# Patient Record
Sex: FEMALE | Race: WHITE | NOT HISPANIC OR LATINO | Employment: OTHER | URBAN - METROPOLITAN AREA
[De-identification: names, ages, dates, MRNs, and addresses within clinical notes are randomized per-mention and may not be internally consistent; named-entity substitution may affect disease eponyms.]

---

## 2017-09-13 ENCOUNTER — HOSPITAL ENCOUNTER (OUTPATIENT)
Facility: MEDICAL CENTER | Age: 67
End: 2017-09-13
Attending: OPHTHALMOLOGY | Admitting: OPHTHALMOLOGY
Payer: MEDICARE

## 2017-10-26 ENCOUNTER — HOSPITAL ENCOUNTER (OUTPATIENT)
Facility: MEDICAL CENTER | Age: 67
End: 2017-10-26
Attending: OPHTHALMOLOGY | Admitting: OPHTHALMOLOGY
Payer: MEDICARE

## 2017-11-01 ENCOUNTER — APPOINTMENT (OUTPATIENT)
Dept: ADMISSIONS | Facility: MEDICAL CENTER | Age: 67
End: 2017-11-01
Attending: OPHTHALMOLOGY
Payer: MEDICARE

## 2017-11-01 DIAGNOSIS — Z01.810 PRE-OPERATIVE CARDIOVASCULAR EXAMINATION: ICD-10-CM

## 2017-11-01 LAB — EKG IMPRESSION: NORMAL

## 2017-11-01 PROCEDURE — 93010 ELECTROCARDIOGRAM REPORT: CPT | Performed by: INTERNAL MEDICINE

## 2017-11-01 PROCEDURE — 93005 ELECTROCARDIOGRAM TRACING: CPT

## 2017-11-01 RX ORDER — UBIDECARENONE 75 MG
100 CAPSULE ORAL
COMMUNITY

## 2017-11-01 RX ORDER — CHLORAL HYDRATE 500 MG
1 CAPSULE ORAL
COMMUNITY
End: 2017-12-12

## 2017-11-01 RX ORDER — VIT C/B6/B5/MAGNESIUM/HERB 173 50-5-6-5MG
1 CAPSULE ORAL
COMMUNITY
End: 2017-12-12

## 2017-11-01 RX ORDER — LIOTHYRONINE SODIUM 5 UG/1
5 TABLET ORAL
COMMUNITY
End: 2018-08-14 | Stop reason: SDUPTHER

## 2017-11-01 RX ORDER — VIT C/HESPERIDIN/BIOFLAVONOIDS 500-100 MG
1 TABLET ORAL DAILY
COMMUNITY
End: 2017-12-12

## 2017-11-01 RX ORDER — MULTIVIT WITH MINERALS/LUTEIN
1 TABLET ORAL DAILY
COMMUNITY
End: 2017-12-12

## 2017-11-07 ENCOUNTER — HOSPITAL ENCOUNTER (OUTPATIENT)
Facility: MEDICAL CENTER | Age: 67
End: 2017-11-07
Attending: OPHTHALMOLOGY | Admitting: OPHTHALMOLOGY
Payer: MEDICARE

## 2017-11-07 VITALS
HEIGHT: 68 IN | OXYGEN SATURATION: 95 % | BODY MASS INDEX: 28.23 KG/M2 | HEART RATE: 83 BPM | RESPIRATION RATE: 16 BRPM | TEMPERATURE: 98.2 F | WEIGHT: 186.29 LBS | DIASTOLIC BLOOD PRESSURE: 66 MMHG | SYSTOLIC BLOOD PRESSURE: 136 MMHG

## 2017-11-07 PROCEDURE — 700101 HCHG RX REV CODE 250

## 2017-11-07 PROCEDURE — 99152 MOD SED SAME PHYS/QHP 5/>YRS: CPT | Performed by: OPHTHALMOLOGY

## 2017-11-07 PROCEDURE — 160048 HCHG OR STATISTICAL LEVEL 1-5: Performed by: OPHTHALMOLOGY

## 2017-11-07 PROCEDURE — 160002 HCHG RECOVERY MINUTES (STAT): Performed by: OPHTHALMOLOGY

## 2017-11-07 PROCEDURE — 501749 HCHG SHELL REV 276: Performed by: OPHTHALMOLOGY

## 2017-11-07 PROCEDURE — 500855 HCHG NEEDLE, IRRIG CYSTOTOME 27G: Performed by: OPHTHALMOLOGY

## 2017-11-07 PROCEDURE — 500791 HCHG KNIFE, SLIT: Performed by: OPHTHALMOLOGY

## 2017-11-07 PROCEDURE — 160035 HCHG PACU - 1ST 60 MINS PHASE I: Performed by: OPHTHALMOLOGY

## 2017-11-07 PROCEDURE — 700111 HCHG RX REV CODE 636 W/ 250 OVERRIDE (IP)

## 2017-11-07 PROCEDURE — 160029 HCHG SURGERY MINUTES - 1ST 30 MINS LEVEL 4: Performed by: OPHTHALMOLOGY

## 2017-11-07 DEVICE — LENS PRE-LOADED TECNIS CLEAR MONO 6.0MM 18.5D: Type: IMPLANTABLE DEVICE | Status: FUNCTIONAL

## 2017-11-07 RX ORDER — TROPICAMIDE 10 MG/ML
SOLUTION/ DROPS OPHTHALMIC
Status: COMPLETED
Start: 2017-11-07 | End: 2017-11-07

## 2017-11-07 RX ORDER — MOXIFLOXACIN 5 MG/ML
SOLUTION/ DROPS OPHTHALMIC
Status: COMPLETED
Start: 2017-11-07 | End: 2017-11-07

## 2017-11-07 RX ORDER — SODIUM CHLORIDE, SODIUM LACTATE, POTASSIUM CHLORIDE, CALCIUM CHLORIDE 600; 310; 30; 20 MG/100ML; MG/100ML; MG/100ML; MG/100ML
INJECTION, SOLUTION INTRAVENOUS CONTINUOUS
Status: DISCONTINUED | OUTPATIENT
Start: 2017-11-07 | End: 2017-11-07 | Stop reason: HOSPADM

## 2017-11-07 RX ORDER — KETOROLAC TROMETHAMINE 5 MG/ML
1 SOLUTION OPHTHALMIC ONCE
Status: DISCONTINUED | OUTPATIENT
Start: 2017-11-07 | End: 2017-11-07 | Stop reason: HOSPADM

## 2017-11-07 RX ORDER — TETRACAINE HYDROCHLORIDE 5 MG/ML
SOLUTION OPHTHALMIC
Status: COMPLETED
Start: 2017-11-07 | End: 2017-11-07

## 2017-11-07 RX ORDER — PHENYLEPHRINE HYDROCHLORIDE 25 MG/ML
SOLUTION/ DROPS OPHTHALMIC
Status: COMPLETED
Start: 2017-11-07 | End: 2017-11-07

## 2017-11-07 RX ADMIN — TROPICAMIDE 1 DROP: 10 SOLUTION/ DROPS OPHTHALMIC at 09:04

## 2017-11-07 RX ADMIN — SODIUM CHLORIDE, SODIUM LACTATE, POTASSIUM CHLORIDE, CALCIUM CHLORIDE 1000 ML: 600; 310; 30; 20 INJECTION, SOLUTION INTRAVENOUS at 09:15

## 2017-11-07 RX ADMIN — TETRACAINE HYDROCHLORIDE 1 DROP: 5 SOLUTION OPHTHALMIC at 09:04

## 2017-11-07 RX ADMIN — PHENYLEPHRINE HYDROCHLORIDE 1 DROP: 2.5 SOLUTION/ DROPS OPHTHALMIC at 09:04

## 2017-11-07 RX ADMIN — MOXIFLOXACIN HYDROCHLORIDE 1 DROP: 5 SOLUTION/ DROPS OPHTHALMIC at 09:04

## 2017-11-07 ASSESSMENT — PAIN SCALES - GENERAL
PAINLEVEL_OUTOF10: 0

## 2017-11-07 NOTE — DISCHARGE INSTRUCTIONS
HOME CARE INSTRUCTIONS FOR CATARACT SURGERY    ACTIVITY: Rest and take it easy for the first 24 hours. We strongly suggest that a responsible adult remain with you during that time. It is normal to feel sleepy. We encourage you to not do anything that requires balance, judgment or coordination. Be extra careful when walking (with a dilated eye, it is easier to trip and fall).     FOR 24 HOURS, DO NOT:       Drive, operate machinery or run household appliances.        Drink beer or alcoholic beverages.        Make important decisions or sign legal documents.     DIET: To avoid nausea, slowly advance diet as tolerated, avoiding spicy or greasy foods for the first meal.     MEDICATIONS: Resume taking daily medication. You may take Tylenol for mild discomfort, if needed.     SURGICAL DRESSING: Eye shield as instructed by your doctor. Dark glasses should be worn while in the sunlight.     Follow your Physician's instruction Sheet. Eye Kit Given    A follow-up appointment is scheduled with your doctor tomorrow at __7:30am_____ am/pm.     You should call 911 if you develop problems with breathing or chest pain.  You should CALL YOUR PHYSICIAN if you develop: Sharp stabbing pain or sudden change in vision in your operative eye. If you are unable to contact your doctor or the surgical center, you should go to the nearest emergency room or urgent care center. Physician's telephone # __DR DURBIN [phone]___    If any questions arise, call your doctor. If your doctor is not available, please feel free to call Same Day Surgery at [phone]. You can also call the [facility] Hotline open 24 hours/day, 7 days/week and speak to a nurse at [phone] or [phone].     I acknowledge receipt and understanding of these Home Care Instructions.    ______________________________     _______________________________            Signature of Patient / Responsible Adult                                                       RN Signature    A  registered nurse may call you a few days after your surgery to see how you are doing.   You may also receive a survey in the mail within the next two weeks and we ask that you take a few moments to complete and return the survey. Our goal is to provide you with very good care and we value your comments. Thank you for choosing [facility].

## 2017-11-07 NOTE — OR NURSING
1056 Received pt from the OR with Dr Gao, right eye dilated.  VSS, in no signs of distress. Pt aware of POC.    1130 Discharge instructions given to pt and family, both verbalize understanding.   Pt meets discharge criteria. Able to dress and steady on feet.    1137 Pt discharged, ambulatory to car.  All questions/concerns addressed.

## 2017-12-12 ENCOUNTER — OFFICE VISIT (OUTPATIENT)
Dept: MEDICAL GROUP | Facility: MEDICAL CENTER | Age: 67
End: 2017-12-12
Payer: MEDICARE

## 2017-12-12 VITALS
HEIGHT: 68 IN | OXYGEN SATURATION: 94 % | SYSTOLIC BLOOD PRESSURE: 124 MMHG | BODY MASS INDEX: 28.67 KG/M2 | HEART RATE: 80 BPM | RESPIRATION RATE: 16 BRPM | DIASTOLIC BLOOD PRESSURE: 64 MMHG | WEIGHT: 189.2 LBS | TEMPERATURE: 97.8 F

## 2017-12-12 DIAGNOSIS — R53.83 FATIGUE, UNSPECIFIED TYPE: ICD-10-CM

## 2017-12-12 DIAGNOSIS — E03.9 ACQUIRED HYPOTHYROIDISM: ICD-10-CM

## 2017-12-12 DIAGNOSIS — R73.02 IGT (IMPAIRED GLUCOSE TOLERANCE): ICD-10-CM

## 2017-12-12 DIAGNOSIS — Z13.6 SCREENING FOR CARDIOVASCULAR CONDITION: ICD-10-CM

## 2017-12-12 PROCEDURE — 99204 OFFICE O/P NEW MOD 45 MIN: CPT | Performed by: INTERNAL MEDICINE

## 2017-12-12 RX ORDER — LEVOTHYROXINE SODIUM 0.05 MG/1
50 TABLET ORAL
COMMUNITY
End: 2018-05-15 | Stop reason: SDUPTHER

## 2017-12-12 NOTE — PROGRESS NOTES
"CC: Establishing her multiple issues    HPI:   Jason presents today with the following.    1. Acquired hypothyroidism  Presents having been maintained on thyroid medication dual therapy for some time. Unclear why she is on dual therapy as her thyroid dose is only 50 µg. She is complaining of fatigue.    2. IGT (impaired glucose tolerance)  Elevated blood sugars in the past complaining of excessive thirst.    3. Fatigue, unspecified type  Main complaint is fatigue she does not feel rested in the morning. She does report occasional snoring TSH was checked recently found to be in reasonable range. She denies any current chest pains or palpitations. She does report she had a palpitations and elevated blood pressure 100 thyroid medication was increased however she was still on Cytomel when this occurred.    4. Screening for cardiovascular condition        Patient Active Problem List    Diagnosis Date Noted   • Acquired hypothyroidism 12/12/2017   • IGT (impaired glucose tolerance) 12/12/2017       Current Outpatient Prescriptions   Medication Sig Dispense Refill   • levothyroxine (SYNTHROID) 50 MCG Tab Take 50 mcg by mouth Every morning on an empty stomach.     • liothyronine (CYTOMEL) 5 MCG Tab Take 5 mcg by mouth 2 Times a Day.     • Cholecalciferol (VITAMIN D3) 5000 units Tab Take  by mouth every day.     • MULTIPLE VITAMIN PO Take  by mouth every day.     • cyanocobalamin (VITAMIN B-12) 100 MCG Tab Take 100 mcg by mouth every day.     • Calcium Carb-Cholecalciferol (CALCIUM 1000 + D PO) Take  by mouth every day.     • Levothyroxine Sodium (LEVOTHROID PO) Take 50 mcg by mouth every day.       No current facility-administered medications for this visit.          Allergies as of 12/12/2017 - Reviewed 12/12/2017   Allergen Reaction Noted   • Tape Rash 11/01/2017        ROS: As per HPI.    /64   Pulse 80   Temp 36.6 °C (97.8 °F)   Resp 16   Ht 1.727 m (5' 8\")   Wt 85.8 kg (189 lb 3.2 oz)   SpO2 94%   BMI " 28.77 kg/m²     Physical Exam:  Gen:         Alert and oriented, No apparent distress.  Neck:        No Lymphadenopathy or Bruits.  Lungs:     Clear to auscultation bilaterally  CV:          Regular rate and rhythm. No murmurs, rubs or gallops.               Ext:          No clubbing, cyanosis, edema.      Assessment and Plan.   67 y.o. female with the following issues.    1. Acquired hypothyroidism  Recheck TSH discussion today about stopping Cytomel and switching to pure thyroid medication she will take in consideration.  - TSH; Future    2. IGT (impaired glucose tolerance)  Recheck blood work  - HEMOGLOBIN A1C; Future    3. Fatigue, unspecified type  Sending for screening for sleep apnea.  - URINALYSIS,CULTURE IF INDICATED; Future    4. Screening for cardiovascular condition    - COMP METABOLIC PANEL; Future  - LIPID PROFILE; Future

## 2017-12-12 NOTE — LETTER
December 12, 2017     Jason Nation  [address] [place] [address]      Dear Jason:    Thank you for enrolling in "[employer]. Please follow the instructions below to securely access your online medical record. "[employer] allows you to send messages to your healthcare team, view certain test results, renew your prescriptions, schedule appointments, and more.     How Do I Sign Up?  1. In your Internet browser, go to  [email or web address]  2. Click on the Sign Up Now link in the Sign In box. You will see the New Member Sign Up page.  3. Enter your "[employer] Access Code exactly as it appears below (case sensitive). You will not need to use this code after you’ve completed the sign-up process. If you do not sign up before the expiration date, you must request a new code.  "[employer] Access Code: [email or web address]  Expires: 1/11/2018  7:47 AM    4. Enter your Email address and Date of Birth (mm/dd/yyyy) as indicated and click Submit. You will be taken to the next sign-up page.  5. Create a "[employer] ID (case sensitive) . This will be your "[employer] login ID and cannot be changed, so think of one that is secure and easy to remember.  6. Create a "[employer] password  (case sensitive).   · Your password must be a length of at least 6 characters/digits.  · It must include at least 1 numeric.  · You can change your password at any time.  7. Enter your Password Reset Question and Answer. This can be used at a later time if you forget your password.   8. Enter your e-mail address. You will receive e-mail notification when new information is available in "[employer].  9. Click Sign Up. You can now view your medical record.     Additional Information  Please contact "[employer] Customer Support at [phone] for any questions . Remember, "[employer] is NOT to be used for urgent needs. For medical emergencies, dial 911.          Introducing "[employer]    [facility]’s Secure, Online Health Connection      [facility] now offers  convenient, online access to your healthcare team and personal health information.  [employer] makes managing your healthcare easier than ever, and allows you to:  • Email your healthcare provider securely and privately  • Access your test results  • Request prescription refills 24 hours a day  • View your personal medical records from home  • Schedule or change your appointments  • View your Insurance and Billing Information  • Pay bills online ? Coming soon!        Sign below to get started:  I hereby request access to [employer] application.  I agree to abide by the [employer] Terms and Conditions, which will be provided to me upon activating my account.       __________________________________        _________________________  Name (Please Print)          Date of Birth     __________________________________       _________________________  Signature          Primary Care Provider      _______________  Date                          *For Internal Use Only: Please scan this form into the patient’s chart. Click on  - Select Patient - Attach to Encounter:  - Document Type: Consent   - Document Description: MyChart Consent

## 2017-12-21 ENCOUNTER — HOSPITAL ENCOUNTER (OUTPATIENT)
Dept: LAB | Facility: MEDICAL CENTER | Age: 67
End: 2017-12-21
Attending: INTERNAL MEDICINE
Payer: MEDICARE

## 2017-12-21 DIAGNOSIS — R73.02 IGT (IMPAIRED GLUCOSE TOLERANCE): ICD-10-CM

## 2017-12-21 DIAGNOSIS — E03.9 ACQUIRED HYPOTHYROIDISM: ICD-10-CM

## 2017-12-21 DIAGNOSIS — Z13.6 SCREENING FOR CARDIOVASCULAR CONDITION: ICD-10-CM

## 2017-12-21 DIAGNOSIS — R53.83 FATIGUE, UNSPECIFIED TYPE: ICD-10-CM

## 2017-12-21 LAB
25(OH)D3 SERPL-MCNC: 33 NG/ML (ref 30–100)
ALBUMIN SERPL BCP-MCNC: 3.9 G/DL (ref 3.2–4.9)
ALBUMIN/GLOB SERPL: 1.4 G/DL
ALP SERPL-CCNC: 66 U/L (ref 30–99)
ALT SERPL-CCNC: 24 U/L (ref 2–50)
ANION GAP SERPL CALC-SCNC: 5 MMOL/L (ref 0–11.9)
APPEARANCE UR: CLEAR
AST SERPL-CCNC: 20 U/L (ref 12–45)
BILIRUB SERPL-MCNC: 0.8 MG/DL (ref 0.1–1.5)
BILIRUB UR QL STRIP.AUTO: NEGATIVE
BUN SERPL-MCNC: 21 MG/DL (ref 8–22)
CALCIUM SERPL-MCNC: 9.4 MG/DL (ref 8.5–10.5)
CHLORIDE SERPL-SCNC: 106 MMOL/L (ref 96–112)
CHOLEST SERPL-MCNC: 196 MG/DL (ref 100–199)
CO2 SERPL-SCNC: 28 MMOL/L (ref 20–33)
COLOR UR: YELLOW
CREAT SERPL-MCNC: 0.81 MG/DL (ref 0.5–1.4)
CULTURE IF INDICATED INDCX: NO UA CULTURE
EST. AVERAGE GLUCOSE BLD GHB EST-MCNC: 120 MG/DL
GFR SERPL CREATININE-BSD FRML MDRD: >60 ML/MIN/1.73 M 2
GLOBULIN SER CALC-MCNC: 2.8 G/DL (ref 1.9–3.5)
GLUCOSE SERPL-MCNC: 98 MG/DL (ref 65–99)
GLUCOSE UR STRIP.AUTO-MCNC: NEGATIVE MG/DL
HBA1C MFR BLD: 5.8 % (ref 0–5.6)
HDLC SERPL-MCNC: 43 MG/DL
KETONES UR STRIP.AUTO-MCNC: NEGATIVE MG/DL
LDLC SERPL CALC-MCNC: 127 MG/DL
LEUKOCYTE ESTERASE UR QL STRIP.AUTO: NEGATIVE
MICRO URNS: NORMAL
NITRITE UR QL STRIP.AUTO: NEGATIVE
PH UR STRIP.AUTO: 6 [PH]
POTASSIUM SERPL-SCNC: 4.4 MMOL/L (ref 3.6–5.5)
PROT SERPL-MCNC: 6.7 G/DL (ref 6–8.2)
PROT UR QL STRIP: NEGATIVE MG/DL
RBC UR QL AUTO: NEGATIVE
SODIUM SERPL-SCNC: 139 MMOL/L (ref 135–145)
SP GR UR STRIP.AUTO: 1.01
TRIGL SERPL-MCNC: 129 MG/DL (ref 0–149)
TSH SERPL DL<=0.005 MIU/L-ACNC: 3.86 UIU/ML (ref 0.38–5.33)
UROBILINOGEN UR STRIP.AUTO-MCNC: 0.2 MG/DL

## 2017-12-21 PROCEDURE — 36415 COLL VENOUS BLD VENIPUNCTURE: CPT

## 2017-12-21 PROCEDURE — 84443 ASSAY THYROID STIM HORMONE: CPT

## 2017-12-21 PROCEDURE — 83036 HEMOGLOBIN GLYCOSYLATED A1C: CPT | Mod: GA

## 2017-12-21 PROCEDURE — 81003 URINALYSIS AUTO W/O SCOPE: CPT

## 2017-12-21 PROCEDURE — 80061 LIPID PANEL: CPT

## 2017-12-21 PROCEDURE — 80053 COMPREHEN METABOLIC PANEL: CPT

## 2017-12-21 PROCEDURE — 82306 VITAMIN D 25 HYDROXY: CPT | Mod: GA

## 2018-01-03 DIAGNOSIS — G47.33 OSA (OBSTRUCTIVE SLEEP APNEA): ICD-10-CM

## 2018-05-08 ENCOUNTER — SLEEP CENTER VISIT (OUTPATIENT)
Dept: SLEEP MEDICINE | Facility: MEDICAL CENTER | Age: 68
End: 2018-05-08
Payer: MEDICARE

## 2018-05-08 VITALS
HEIGHT: 68 IN | HEART RATE: 82 BPM | BODY MASS INDEX: 28.95 KG/M2 | OXYGEN SATURATION: 95 % | SYSTOLIC BLOOD PRESSURE: 128 MMHG | RESPIRATION RATE: 15 BRPM | DIASTOLIC BLOOD PRESSURE: 88 MMHG | WEIGHT: 191 LBS

## 2018-05-08 DIAGNOSIS — G25.81 RLS (RESTLESS LEGS SYNDROME): ICD-10-CM

## 2018-05-08 DIAGNOSIS — G47.33 OSA (OBSTRUCTIVE SLEEP APNEA): ICD-10-CM

## 2018-05-08 DIAGNOSIS — E83.10 IRON METABOLISM DISORDER: ICD-10-CM

## 2018-05-08 PROCEDURE — 99204 OFFICE O/P NEW MOD 45 MIN: CPT | Performed by: FAMILY MEDICINE

## 2018-05-08 NOTE — PROGRESS NOTES
"     [facility]  Consult Note     Date: 5/8/2018 / Time: 9:58 AM    Patient ID:   Name:             Jason Nation   YOB: 1950  Age:                 68 y.o.  female   MRN:               [id number]      Thank you for requesting a sleep medicine consultation on Jason Nation at the sleep center. She presents today with the chief complaints of excessive daytime sleepiness. She is referred by Dr. Barahona for evaluation and treatment of sleep disorder breathing .     HISTORY OF PRESENT ILLNESS:       At night,  Jason Nation goes to bed around 10-11 pm on weekdays and around 12-1 am on weekends. She gets out of bed at 7-8 am on weekdays and at 7 am on weekends. She averages 7-8 hrs of sleep on a good night and 6 hrs on a bad night. Pt has bad nights 2 nights per week. She falls asleep within 10 minutes. She awakens 1 times a night due to no obvious reason. It takes her long time to fall back asleep.     Rios is not aware of snoring/witnessed apneas/gasping or choking in sleep. She  denies any symptoms of narcolepsy such as sleep paralysis or cataplexy, or any symptoms to suggest parasomnias such as sleep walking or acting out of dreams. She  has not used any medications for her sleep problem.    She has symptoms of restless legs syndrome such as an \"urge to move\"  She  legs in the evening or bedtime. It has rare occurrence. It started as a \"kid\". Denies it interfering with sleep initiation or maintenance. Denies hx of anemia and FMH of RLS.     Overall,  She doesnot finds her sleep refreshing. When She  wakes up in the morning, She does feel tired. In terms of  excessive daytime sleepiness,  She complains of sleepiness while reading or watching TV.   She  Does take regular naps. The naps are usually 20 min long.      REVIEW OF SYSTEMS:       Constitutional: Denies fevers, Denies weight changes  Eyes: Denies changes in vision, no eye pain  Ears/Nose/Throat/Mouth: Denies nasal congestion or " sore throat   Cardiovascular: Denies chest pain or palpitations   Respiratory: Denies shortness of breath , Denies cough  Gastrointestinal/Hepatic: Denies abdominal pain, nausea, vomiting, diarrhea, constipation or GI bleeding   Genitourinary: Denies bladder dysfunction, dysuria or frequency  Musculoskeletal/Rheum: Denies  joint pain and swelling   Skin/Breast: Denies rash, denies breast lumps or discharge  Neurological: Denies headache, confusion, memory loss or focal weakness/parasthesias  Psychiatric: denies mood disorder     Comprehensive review of systems form is reviewed with the patient and is attached in the EMR.     PMH:  has a past medical history of Asthma; Breath shortness; Bronchitis; Cataract; Chickenpox; Disorder of thyroid; [place] measles; Heart burn; Influenza; and Pneumonia (1982).  MEDS:   Current Outpatient Prescriptions:   •  levothyroxine (SYNTHROID) 50 MCG Tab, Take 50 mcg by mouth Every morning on an empty stomach., Disp: , Rfl:   •  Levothyroxine Sodium (LEVOTHROID PO), Take 50 mcg by mouth every day., Disp: , Rfl:   •  liothyronine (CYTOMEL) 5 MCG Tab, Take 5 mcg by mouth 2 Times a Day., Disp: , Rfl:   •  Cholecalciferol (VITAMIN D3) 5000 units Tab, Take  by mouth every day., Disp: , Rfl:   •  MULTIPLE VITAMIN PO, Take  by mouth every day., Disp: , Rfl:   •  cyanocobalamin (VITAMIN B-12) 100 MCG Tab, Take 100 mcg by mouth every day., Disp: , Rfl:   •  Calcium Carb-Cholecalciferol (CALCIUM 1000 + D PO), Take  by mouth every day., Disp: , Rfl:   ALLERGIES:   Allergies   Allergen Reactions   • Tape Rash     Redness and rash     SURGHX:   Past Surgical History:   Procedure Laterality Date   • CATARACT PHACO WITH IOL Right 11/7/2017    Procedure: CATARACT PHACO WITH IOL;  Surgeon: Keron Khanna M.D.;  Location: SURGERY SAME DAY [facility];  Service: Ophthalmology   • GYN SURGERY  1974    c/section     SOCHX:  reports that she has never smoked. She has never used smokeless tobacco. She  "reports that she drinks alcohol. She reports that she does not use drugs..   FH:   Family History   Problem Relation Age of Onset   • Cancer Mother    • Cancer Father    • Cancer Paternal Aunt    • Cancer Paternal Uncle    • Sleep Apnea Neg Hx            Physical Exam:  Vitals/ General Appearance:   Weight/BMI: Body mass index is 29.04 kg/m².  Blood pressure 128/88, pulse 82, resp. rate 15, height 1.727 m (5' 8\"), weight 86.6 kg (191 lb), SpO2 95 %.  Vitals:    05/08/18 1057   BP: 128/88   Pulse: 82   Resp: 15   SpO2: 95%   Weight: 86.6 kg (191 lb)   Height: 1.727 m (5' 8\")       Pt. is alert and oriented to time, place and person. Cooperative and in no apparent distress.       1. Head: Atraumatic, normocephalic.   2. Ears: Normal tympanic membrane and no discharge  3. Nose: No inferior turbinate hypertophy, no septal deviation, no polyp.   4. Throat: Oropharynx appears crowded in that the palate is overhanging (Malam Mary scale 3. Tonsils are not enlarged, uvula is large, pharynx not inflamed. Tongue is  large. has intact dentition and oral hygiene is fair.  5. Neck: Supple. No thyromegaly  6. Chest: Trachea central  7. Lungs auscultation: B/L good air entry, vesicular breath sounds, no adventitious sounds  8. Heart auscultation: 1st and 2nd heart sounds normal, regular rhythm. No appreciable murmur.  9.  Extremities:  no clubbing, no pedal edema.   Peripheral pulses felt.  10. Skin: No rash  11. NEUROLOGICAL EXAMINATION: On neurological exam, the patient was alert and oriented x3. speech was clear and fluent without dysarthria. INVESTIGATIONS:       ASSESSMENT AND PLAN     1. She  has symptoms of Obstructive Sleep Apnea (GERALDO). She  has excessive daytime sleepiness that interferes with activites of daily living. She  risk factors for GERALDO include crowded oropharynx.     The pathophysiology of GERALDO and the increased risk of cardiovascular morbidity from untreated GERALDO is discussed in detail with the patient.    We " have discussed diagnostic options including in-laboratory, attended polysomnography and home sleep testing. We have also discussed treatment options including airway pressurization, reconstructive otolaryngologic surgery, dental appliances and weight management.       Subsequently,treatment options will be discussed based on the diagnostic study. Meanwhile, She is urged to avoid supine sleep, weight gain and alcoholic beverages since all of these can worsen GERALDO. She is cautioned against drowsy driving. If She feels sleepy while driving, She must pull over for a break/nap, rather than persist on the road, in the interest of She own safety and that of others on the road.    Plan  -  She  will be scheduled for an overnight PSG to assess sleep related  breathing disorder.    2. Restless Leg syndrome: she has hx of RLS and currently not on medication. It does not interfere with sleep onset or sleep maintanance. Denies hx of anemia. RLS could be primary or secondary in nature. RLS can be exacerbated in light of sleep related breathing disorder. Other common cause of RLS is low ferritin level, which were ordered today. We will reassess RLS symptoms once GERALDO is treated and well controlled. Medication options were discussed however deferred on this visit.         3. Regarding treatment of other past medical problems and general health maintenance,  She is urged to follow up with PCP.

## 2018-05-09 ENCOUNTER — HOSPITAL ENCOUNTER (OUTPATIENT)
Dept: LAB | Facility: MEDICAL CENTER | Age: 68
End: 2018-05-09
Attending: FAMILY MEDICINE
Payer: MEDICARE

## 2018-05-09 DIAGNOSIS — E83.10 IRON METABOLISM DISORDER: ICD-10-CM

## 2018-05-09 DIAGNOSIS — G25.81 RLS (RESTLESS LEGS SYNDROME): ICD-10-CM

## 2018-05-09 LAB — FERRITIN SERPL-MCNC: 85.6 NG/ML (ref 10–291)

## 2018-05-09 PROCEDURE — 36415 COLL VENOUS BLD VENIPUNCTURE: CPT

## 2018-05-09 PROCEDURE — 82728 ASSAY OF FERRITIN: CPT

## 2018-08-14 DIAGNOSIS — E03.9 ACQUIRED HYPOTHYROIDISM: ICD-10-CM

## 2018-08-14 RX ORDER — LIOTHYRONINE SODIUM 5 UG/1
5 TABLET ORAL
Qty: 180 TAB | Refills: 0 | Status: SHIPPED | OUTPATIENT
Start: 2018-08-14 | End: 2019-01-28 | Stop reason: SDUPTHER

## 2018-09-20 ENCOUNTER — APPOINTMENT (RX ONLY)
Dept: URBAN - METROPOLITAN AREA CLINIC 4 | Facility: CLINIC | Age: 68
Setting detail: DERMATOLOGY
End: 2018-09-20

## 2018-09-20 DIAGNOSIS — Z71.89 OTHER SPECIFIED COUNSELING: ICD-10-CM

## 2018-09-20 DIAGNOSIS — L81.4 OTHER MELANIN HYPERPIGMENTATION: ICD-10-CM

## 2018-09-20 DIAGNOSIS — D18.0 HEMANGIOMA: ICD-10-CM

## 2018-09-20 DIAGNOSIS — L57.0 ACTINIC KERATOSIS: ICD-10-CM

## 2018-09-20 PROBLEM — D18.01 HEMANGIOMA OF SKIN AND SUBCUTANEOUS TISSUE: Status: ACTIVE | Noted: 2018-09-20

## 2018-09-20 PROBLEM — E13.9 OTHER SPECIFIED DIABETES MELLITUS WITHOUT COMPLICATIONS: Status: ACTIVE | Noted: 2018-09-20

## 2018-09-20 PROBLEM — E78.5 HYPERLIPIDEMIA, UNSPECIFIED: Status: ACTIVE | Noted: 2018-09-20

## 2018-09-20 PROBLEM — E03.9 HYPOTHYROIDISM, UNSPECIFIED: Status: ACTIVE | Noted: 2018-09-20

## 2018-09-20 PROCEDURE — 99203 OFFICE O/P NEW LOW 30 MIN: CPT

## 2018-09-20 PROCEDURE — ? COUNSELING

## 2018-09-20 PROCEDURE — ? PRESCRIPTION

## 2018-09-20 RX ORDER — FLUOROURACIL 2 G/40G
CREAM TOPICAL BID
Qty: 1 | Refills: 0 | Status: ERX | COMMUNITY
Start: 2018-09-20

## 2018-09-20 RX ADMIN — FLUOROURACIL: 2 CREAM TOPICAL at 17:53

## 2018-09-20 ASSESSMENT — LOCATION SIMPLE DESCRIPTION DERM
LOCATION SIMPLE: NOSE
LOCATION SIMPLE: LEFT HAND
LOCATION SIMPLE: ABDOMEN
LOCATION SIMPLE: INFERIOR FOREHEAD
LOCATION SIMPLE: RIGHT HAND
LOCATION SIMPLE: RIGHT PRETIBIAL REGION

## 2018-09-20 ASSESSMENT — LOCATION DETAILED DESCRIPTION DERM
LOCATION DETAILED: LEFT RIB CAGE
LOCATION DETAILED: LEFT ULNAR DORSAL HAND
LOCATION DETAILED: RIGHT RADIAL DORSAL HAND
LOCATION DETAILED: NASAL SUPRATIP
LOCATION DETAILED: RIGHT LATERAL PROXIMAL PRETIBIAL REGION
LOCATION DETAILED: INFERIOR MID FOREHEAD

## 2018-09-20 ASSESSMENT — LOCATION ZONE DERM
LOCATION ZONE: HAND
LOCATION ZONE: FACE
LOCATION ZONE: LEG
LOCATION ZONE: NOSE
LOCATION ZONE: TRUNK

## 2018-09-20 NOTE — HPI: SECONDARY COMPLAINT
How Severe Is This Condition?: mild
Additional History: fluorouracil cream was prescribed yesterday another dermatologist for AK treatment but patient has not started this treatment yet.

## 2019-01-17 ENCOUNTER — APPOINTMENT (OUTPATIENT)
Dept: ENDOCRINOLOGY | Facility: MEDICAL CENTER | Age: 69
End: 2019-01-17
Payer: MEDICARE

## 2019-01-28 ENCOUNTER — OFFICE VISIT (OUTPATIENT)
Dept: ENDOCRINOLOGY | Facility: MEDICAL CENTER | Age: 69
End: 2019-01-28
Payer: MEDICARE

## 2019-01-28 VITALS
SYSTOLIC BLOOD PRESSURE: 132 MMHG | WEIGHT: 196 LBS | OXYGEN SATURATION: 93 % | HEART RATE: 93 BPM | DIASTOLIC BLOOD PRESSURE: 84 MMHG | HEIGHT: 68 IN | BODY MASS INDEX: 29.7 KG/M2 | RESPIRATION RATE: 16 BRPM

## 2019-01-28 DIAGNOSIS — E03.8 HYPOTHYROIDISM DUE TO HASHIMOTO'S THYROIDITIS: ICD-10-CM

## 2019-01-28 DIAGNOSIS — E06.3 HYPOTHYROIDISM DUE TO HASHIMOTO'S THYROIDITIS: ICD-10-CM

## 2019-01-28 DIAGNOSIS — E03.9 ACQUIRED HYPOTHYROIDISM: ICD-10-CM

## 2019-01-28 DIAGNOSIS — R73.02 IGT (IMPAIRED GLUCOSE TOLERANCE): ICD-10-CM

## 2019-01-28 PROCEDURE — 99203 OFFICE O/P NEW LOW 30 MIN: CPT | Performed by: PHYSICIAN ASSISTANT

## 2019-01-28 RX ORDER — LIOTHYRONINE SODIUM 5 UG/1
5 TABLET ORAL
Qty: 180 TAB | Refills: 3 | Status: SHIPPED | OUTPATIENT
Start: 2019-01-28 | End: 2020-04-29 | Stop reason: SDUPTHER

## 2019-01-28 RX ORDER — LEVOTHYROXINE SODIUM 0.05 MG/1
50 TABLET ORAL
Qty: 90 TAB | Refills: 3 | Status: SHIPPED | OUTPATIENT
Start: 2019-01-28 | End: 2020-02-14 | Stop reason: SDUPTHER

## 2019-01-28 NOTE — PROGRESS NOTES
New Patient Consult Note  Referred by: Tyree Barahona M.D.      HPI:  Jason Nation is a  68 y.o. old patient who comes in today for evaluation and management of the followin. Hypothyroidism due to Hashimoto's thyroiditis  Patient was diagnosed with Hashimoto's hypothyroidism.  She has been managed in [place] up until approximately 2 years ago when she relocated to [place].  According to the patient most recently her primary care physician became uncomfortable with her taking T3 and requested referral to endocrinology.    According to the patient despite adequate replacement she continued to complain of fatigue therefore T3 was added.    Over the last month she has not been taking her T3 in the afternoon secondary to trying to get into the office.  While on T3 she did denies any history of palpitations tremors shakiness anxiety and or depression.    Synthroid 50 mcg daily - ES with coffee   Cytomel 5 mcg  BID       2. IGT (impaired glucose tolerance)  - 5.8 Glucose 98     3. Vitamin D   - (vitamin D 5000 IUs daily)  Currently on:   2000 IU       ROS:  Constitutional: No weight loss or gain,  fever  HEENT: No difficulty with swallowing, change in voice, or swelling in throat area   Cardiac: No chest pain, palpitations, or racing heart  Resp: No shortness of breath  GI: No abdominal pain, nausea, vomiting, or diarrhea   Neuro: No numbness or tinging in feet  Endo: No heat or cold intolerance, no polyuria or polydipsia  All other systems were reviewed and were negative.    Past Medical History:  Patient Active Problem List    Diagnosis Date Noted   • GERALDO (obstructive sleep apnea) 2018   • Acquired hypothyroidism 2017   • IGT (impaired glucose tolerance) 2017       Past Surgical History:  Past Surgical History:   Procedure Laterality Date   • CATARACT PHACO WITH IOL Right 2017    Procedure: CATARACT PHACO WITH IOL;  Surgeon: Keron Khanna M.D.;  Location: SURGERY SAME  "DAY [facility];  Service: Ophthalmology   • GYN SURGERY  1974    c/section       Allergies:  Tape    Social History:  Social History     Social History   • Marital status: Single     Spouse name: N/A   • Number of children: N/A   • Years of education: N/A     Occupational History   • Not on file.     Social History Main Topics   • Smoking status: Never Smoker   • Smokeless tobacco: Never Used   • Alcohol use Yes      Comment: 2/month   • Drug use: No   • Sexual activity: Not on file     Other Topics Concern   • Not on file     Social History Narrative   • No narrative on file       Family History:  Family History   Problem Relation Age of Onset   • Cancer Mother    • Cancer Father    • Cancer Paternal Aunt    • Cancer Paternal Uncle    • Sleep Apnea Neg Hx        Medications:    Current Outpatient Prescriptions:   •  liothyronine (CYTOMEL) 5 MCG Tab, Take 1 Tab by mouth 2 Times a Day., Disp: 180 Tab, Rfl: 3  •  levothyroxine (SYNTHROID) 50 MCG Tab, Take 1 Tab by mouth Every morning on an empty stomach., Disp: 90 Tab, Rfl: 3  •  Cholecalciferol (VITAMIN D3) 5000 units Tab, Take  by mouth every day., Disp: , Rfl:   •  MULTIPLE VITAMIN PO, Take  by mouth every day., Disp: , Rfl:   •  cyanocobalamin (VITAMIN B-12) 100 MCG Tab, Take 100 mcg by mouth every day., Disp: , Rfl:   •  Calcium Carb-Cholecalciferol (CALCIUM 1000 + D PO), Take  by mouth every day., Disp: , Rfl:     Labs: Reviewed (as above)     Physical Examination:  Vital signs: /84 (BP Location: Right arm, Patient Position: Sitting, BP Cuff Size: Adult)   Pulse 93   Resp 16   Ht 1.727 m (5' 8\")   Wt 88.9 kg (196 lb)   SpO2 93%   BMI 29.80 kg/m²  Body mass index is 29.8 kg/m².  General: No apparent distress, cooperative,   ENMT: Normal on external inspection of nose, lips, normal thyroid exam  Resp: Normal effort, clear to auscultation bilaterally without wheezes, rales or rhonchi  CVS: Regular rate and rhythm, S1 S2 normal, no murmur, rubs or " stacey    Neuro: Alert and oriented  Skin: No rash  Psych: Normal mood and affect, intact memory and able to make informed decisions    Assessment and Plan:    1. Hypothyroidism due to Hashimoto's thyroiditis  We will restart her current regimen of levothyroxine 100 mcg daily and Cytomel 5 mg twice daily.  We will recheck her levels in approximately 6 weeks time.    2. IGT (impaired glucose tolerance)  Reviewed with the patient her glucose was slightly elevated with an elevated A1c.  She does not meet criteria for diabetes but we did discuss diet and nutrition.  According to the patient is difficult for her to exercise as she is used to the [place] weather.      No Follow-up on file.     Thank you for allowing me to participate in the care of this patient.  If you have any questions or concerns please do not hesitate to contact me.    Nimo Wild P.A.-C.  01/28/19    CC:   Tyree Barahona M.D.    This note was created using voice recognition software (Dragon). The accuracy of the dictation is limited by the abilities of the software. I have reviewed the note prior to signing, however some errors in grammar and context are still possible. If you have any questions related to this note please do not hesitate to contact our office.

## 2019-02-21 ENCOUNTER — HOSPITAL ENCOUNTER (OUTPATIENT)
Dept: LAB | Facility: MEDICAL CENTER | Age: 69
End: 2019-02-21
Attending: PHYSICIAN ASSISTANT
Payer: MEDICARE

## 2019-02-21 DIAGNOSIS — E03.8 HYPOTHYROIDISM DUE TO HASHIMOTO'S THYROIDITIS: ICD-10-CM

## 2019-02-21 DIAGNOSIS — E06.3 HYPOTHYROIDISM DUE TO HASHIMOTO'S THYROIDITIS: ICD-10-CM

## 2019-02-21 LAB
T3FREE SERPL-MCNC: 3.74 PG/ML (ref 2.4–4.2)
T4 FREE SERPL-MCNC: 0.88 NG/DL (ref 0.53–1.43)
TSH SERPL DL<=0.005 MIU/L-ACNC: 3.46 UIU/ML (ref 0.38–5.33)

## 2019-02-21 PROCEDURE — 36415 COLL VENOUS BLD VENIPUNCTURE: CPT

## 2019-02-21 PROCEDURE — 84443 ASSAY THYROID STIM HORMONE: CPT

## 2019-02-21 PROCEDURE — 84439 ASSAY OF FREE THYROXINE: CPT

## 2019-02-21 PROCEDURE — 84481 FREE ASSAY (FT-3): CPT

## 2019-02-28 ENCOUNTER — APPOINTMENT (OUTPATIENT)
Dept: ENDOCRINOLOGY | Facility: MEDICAL CENTER | Age: 69
End: 2019-02-28
Payer: MEDICARE

## 2019-04-10 ENCOUNTER — OFFICE VISIT (OUTPATIENT)
Dept: MEDICAL GROUP | Facility: PHYSICIAN GROUP | Age: 69
End: 2019-04-10
Payer: MEDICARE

## 2019-04-10 VITALS
HEART RATE: 84 BPM | OXYGEN SATURATION: 95 % | RESPIRATION RATE: 16 BRPM | BODY MASS INDEX: 29.25 KG/M2 | DIASTOLIC BLOOD PRESSURE: 70 MMHG | SYSTOLIC BLOOD PRESSURE: 142 MMHG | HEIGHT: 68 IN | WEIGHT: 193 LBS | TEMPERATURE: 98.1 F

## 2019-04-10 DIAGNOSIS — R73.02 IGT (IMPAIRED GLUCOSE TOLERANCE): ICD-10-CM

## 2019-04-10 DIAGNOSIS — E03.9 ACQUIRED HYPOTHYROIDISM: ICD-10-CM

## 2019-04-10 DIAGNOSIS — E78.2 MIXED HYPERLIPIDEMIA: ICD-10-CM

## 2019-04-10 PROCEDURE — 99214 OFFICE O/P EST MOD 30 MIN: CPT | Performed by: FAMILY MEDICINE

## 2019-04-10 RX ORDER — CHLORAL HYDRATE 500 MG
CAPSULE ORAL
COMMUNITY

## 2019-04-10 ASSESSMENT — PATIENT HEALTH QUESTIONNAIRE - PHQ9: CLINICAL INTERPRETATION OF PHQ2 SCORE: 0
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
## 2019-05-01 ENCOUNTER — HOSPITAL ENCOUNTER (OUTPATIENT)
Dept: LAB | Facility: MEDICAL CENTER | Age: 69
End: 2019-05-01
Attending: FAMILY MEDICINE
Payer: MEDICARE

## 2019-05-01 DIAGNOSIS — R73.02 IGT (IMPAIRED GLUCOSE TOLERANCE): ICD-10-CM

## 2019-05-01 DIAGNOSIS — E78.2 MIXED HYPERLIPIDEMIA: ICD-10-CM

## 2019-05-01 LAB
ALBUMIN SERPL BCP-MCNC: 4 G/DL (ref 3.2–4.9)
ALBUMIN/GLOB SERPL: 1.6 G/DL
ALP SERPL-CCNC: 63 U/L (ref 30–99)
ALT SERPL-CCNC: 19 U/L (ref 2–50)
ANION GAP SERPL CALC-SCNC: 8 MMOL/L (ref 0–11.9)
AST SERPL-CCNC: 20 U/L (ref 12–45)
BILIRUB SERPL-MCNC: 1.3 MG/DL (ref 0.1–1.5)
BUN SERPL-MCNC: 16 MG/DL (ref 8–22)
CALCIUM SERPL-MCNC: 9.2 MG/DL (ref 8.5–10.5)
CHLORIDE SERPL-SCNC: 104 MMOL/L (ref 96–112)
CHOLEST SERPL-MCNC: 210 MG/DL (ref 100–199)
CO2 SERPL-SCNC: 28 MMOL/L (ref 20–33)
CREAT SERPL-MCNC: 0.82 MG/DL (ref 0.5–1.4)
EST. AVERAGE GLUCOSE BLD GHB EST-MCNC: 120 MG/DL
FASTING STATUS PATIENT QL REPORTED: NORMAL
GLOBULIN SER CALC-MCNC: 2.5 G/DL (ref 1.9–3.5)
GLUCOSE SERPL-MCNC: 99 MG/DL (ref 65–99)
HBA1C MFR BLD: 5.8 % (ref 0–5.6)
HDLC SERPL-MCNC: 37 MG/DL
LDLC SERPL CALC-MCNC: 146 MG/DL
POTASSIUM SERPL-SCNC: 3.8 MMOL/L (ref 3.6–5.5)
PROT SERPL-MCNC: 6.5 G/DL (ref 6–8.2)
SODIUM SERPL-SCNC: 140 MMOL/L (ref 135–145)
TRIGL SERPL-MCNC: 136 MG/DL (ref 0–149)

## 2019-05-01 PROCEDURE — 36415 COLL VENOUS BLD VENIPUNCTURE: CPT

## 2019-05-01 PROCEDURE — 83036 HEMOGLOBIN GLYCOSYLATED A1C: CPT | Mod: GA

## 2019-05-01 PROCEDURE — 80053 COMPREHEN METABOLIC PANEL: CPT

## 2019-05-01 PROCEDURE — 80061 LIPID PANEL: CPT

## 2019-05-23 ENCOUNTER — OFFICE VISIT (OUTPATIENT)
Dept: MEDICAL GROUP | Facility: PHYSICIAN GROUP | Age: 69
End: 2019-05-23
Payer: MEDICARE

## 2019-05-23 VITALS
HEART RATE: 82 BPM | DIASTOLIC BLOOD PRESSURE: 80 MMHG | HEIGHT: 68 IN | BODY MASS INDEX: 28.64 KG/M2 | WEIGHT: 189 LBS | SYSTOLIC BLOOD PRESSURE: 126 MMHG | TEMPERATURE: 98.7 F | OXYGEN SATURATION: 94 %

## 2019-05-23 DIAGNOSIS — E78.2 MIXED HYPERLIPIDEMIA: ICD-10-CM

## 2019-05-23 DIAGNOSIS — Z23 NEED FOR VACCINATION: ICD-10-CM

## 2019-05-23 DIAGNOSIS — G47.33 OSA (OBSTRUCTIVE SLEEP APNEA): ICD-10-CM

## 2019-05-23 DIAGNOSIS — G25.81 RESTLESS LEGS: ICD-10-CM

## 2019-05-23 DIAGNOSIS — E03.9 ACQUIRED HYPOTHYROIDISM: ICD-10-CM

## 2019-05-23 DIAGNOSIS — R73.02 IGT (IMPAIRED GLUCOSE TOLERANCE): ICD-10-CM

## 2019-05-23 PROCEDURE — 90670 PCV13 VACCINE IM: CPT | Performed by: FAMILY MEDICINE

## 2019-05-23 PROCEDURE — G0009 ADMIN PNEUMOCOCCAL VACCINE: HCPCS | Performed by: FAMILY MEDICINE

## 2019-05-23 PROCEDURE — G0439 PPPS, SUBSEQ VISIT: HCPCS | Performed by: FAMILY MEDICINE

## 2019-05-23 ASSESSMENT — PATIENT HEALTH QUESTIONNAIRE - PHQ9: CLINICAL INTERPRETATION OF PHQ2 SCORE: 0

## 2019-05-23 ASSESSMENT — ACTIVITIES OF DAILY LIVING (ADL): BATHING_REQUIRES_ASSISTANCE: 0

## 2019-05-23 ASSESSMENT — ENCOUNTER SYMPTOMS: GENERAL WELL-BEING: GOOD
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
## 2019-11-19 ENCOUNTER — HOSPITAL ENCOUNTER (OUTPATIENT)
Dept: LAB | Facility: MEDICAL CENTER | Age: 69
End: 2019-11-19
Attending: FAMILY MEDICINE
Payer: MEDICARE

## 2019-11-19 DIAGNOSIS — E78.2 MIXED HYPERLIPIDEMIA: ICD-10-CM

## 2019-11-19 LAB
CHOLEST SERPL-MCNC: 210 MG/DL (ref 100–199)
FASTING STATUS PATIENT QL REPORTED: NORMAL
HDLC SERPL-MCNC: 37 MG/DL
LDLC SERPL CALC-MCNC: 140 MG/DL
TRIGL SERPL-MCNC: 165 MG/DL (ref 0–149)

## 2019-11-19 PROCEDURE — 36415 COLL VENOUS BLD VENIPUNCTURE: CPT

## 2019-11-19 PROCEDURE — 80061 LIPID PANEL: CPT

## 2019-12-09 ENCOUNTER — OFFICE VISIT (OUTPATIENT)
Dept: MEDICAL GROUP | Facility: PHYSICIAN GROUP | Age: 69
End: 2019-12-09
Payer: MEDICARE

## 2019-12-09 VITALS
DIASTOLIC BLOOD PRESSURE: 68 MMHG | WEIGHT: 190 LBS | OXYGEN SATURATION: 95 % | HEART RATE: 80 BPM | BODY MASS INDEX: 28.79 KG/M2 | SYSTOLIC BLOOD PRESSURE: 128 MMHG | TEMPERATURE: 98.8 F | RESPIRATION RATE: 20 BRPM | HEIGHT: 68 IN

## 2019-12-09 DIAGNOSIS — E78.2 MIXED HYPERLIPIDEMIA: ICD-10-CM

## 2019-12-09 DIAGNOSIS — Z72.89 OTHER PROBLEMS RELATED TO LIFESTYLE: ICD-10-CM

## 2019-12-09 DIAGNOSIS — E03.9 ACQUIRED HYPOTHYROIDISM: ICD-10-CM

## 2019-12-09 DIAGNOSIS — Z23 NEED FOR VACCINATION: ICD-10-CM

## 2019-12-09 PROCEDURE — 90662 IIV NO PRSV INCREASED AG IM: CPT | Performed by: FAMILY MEDICINE

## 2019-12-09 PROCEDURE — G0008 ADMIN INFLUENZA VIRUS VAC: HCPCS | Performed by: FAMILY MEDICINE

## 2019-12-09 PROCEDURE — 99213 OFFICE O/P EST LOW 20 MIN: CPT | Mod: 25 | Performed by: FAMILY MEDICINE

## 2019-12-09 SDOH — HEALTH STABILITY: MENTAL HEALTH: HOW OFTEN DO YOU HAVE 6 OR MORE DRINKS ON ONE OCCASION?: NEVER

## 2019-12-09 SDOH — HEALTH STABILITY: MENTAL HEALTH: HOW MANY STANDARD DRINKS CONTAINING ALCOHOL DO YOU HAVE ON A TYPICAL DAY?: 1 OR 2

## 2019-12-09 SDOH — HEALTH STABILITY: MENTAL HEALTH: HOW OFTEN DO YOU HAVE A DRINK CONTAINING ALCOHOL?: MONTHLY OR LESS

## 2019-12-10 NOTE — PROGRESS NOTES
cc: Follow-up for close    Subjective:     Jason Nation is a 69 y.o. female presenting for six-month follow-up.     Mixed hyperlipidemia  Chronic medical diagnosis.  Has never been on a statin.  Continues to take over-the-counter omega-3 and flaxseed oil.  Recent labs from November have total cholesterol 210, triglycerides 165, HDL 37, and .    2. Acquired hypothyroidism  Chronic medical diagnosis.  Currently taking levothyroxine 50 mcg daily and Cytomel 5 mg twice a day.  She is referred to endocrine, Dr Grimaldo, and has had the doctor canceled the appointment twice.  We will try to reschedule an appointment with him.  Previous TSH from February 2019 his TSH at 3.46, free T4 0.88 and free T3 3.74.    3. Other problems related to lifestyle  Needs hepatitis C screening    4. Need for vaccination  Would like to get flu vaccine      Review of systems:  See above and negative for fever chills.  Allergies   Allergen Reactions   • Fish Allergy Diarrhea     Diarrhea, upset stomach   • Tape Rash     Redness and rash         Current Outpatient Medications:   •  VITAMIN K PO, Take  by mouth., Disp: , Rfl:   •  Omega-3 1000 MG Cap, Take  by mouth., Disp: , Rfl:   •  Flaxseed, Linseed, (FLAXSEED OIL PO), Take  by mouth., Disp: , Rfl:   •  liothyronine (CYTOMEL) 5 MCG Tab, Take 1 Tab by mouth 2 Times a Day., Disp: 180 Tab, Rfl: 3  •  levothyroxine (SYNTHROID) 50 MCG Tab, Take 1 Tab by mouth Every morning on an empty stomach., Disp: 90 Tab, Rfl: 3  •  Cholecalciferol (VITAMIN D3) 5000 units Tab, Take  by mouth every day., Disp: , Rfl:   •  MULTIPLE VITAMIN PO, Take  by mouth every day., Disp: , Rfl:   •  cyanocobalamin (VITAMIN B-12) 100 MCG Tab, Take 100 mcg by mouth every day., Disp: , Rfl:   •  Calcium Carb-Cholecalciferol (CALCIUM 1000 + D PO), Take  by mouth every day., Disp: , Rfl:     Allergies, past medical history, past surgical history, family history, social history reviewed and updated    Objective:  "    Vitals: /68 (BP Location: Right arm, Patient Position: Sitting, BP Cuff Size: Adult)   Pulse 80   Temp 37.1 °C (98.8 °F) (Temporal)   Resp 20   Ht 1.715 m (5' 7.5\")   Wt 86.2 kg (190 lb)   SpO2 95%   BMI 29.32 kg/m²   General:  Alert, pleasant, NAD  Eyes:  normal inspection of conjunctivae and lids, EOMI,   ENMT:  External ears and nose are normal.    Neck  supple,   Heart:  Regular rate and rhythm,  No LE edema  Respiratory:  Normal respiratory effort, Clear to auscultation bilaterally.  Abdomen:   soft, Non-distended,   Skin:  Warm, dry, no rashes,   Musculoskeletal:  Normal gait, Normal digits and nails.  Neurological: No tremors,   Psych:   Affect/mood is normal, judgement is good, memory is intact, grooming is appropriate.    Assessment/Plan:     Jason was seen today for follow-up and immunizations.    Diagnoses and all orders for this visit:    Mixed hyperlipidemia  Chronic medical diagnosis.  Not well controlled.  Does not want to start a statin.  Would like to work on diet lifestyle measures.  Increased risk of adverse events i.e. stroke and heart attacks discussed with patient.  We will plan on repeating labs in 6 months prior to next appointment  .-     Lipid Profile; Future    Acquired hypothyroidism  Chronic medical diagnosis.  Stable.  Continue with current dose of levothyroxine.  -     TSH; Future  -     FREE THYROXINE; Future  -     T3 FREE; Future    Other problems related to lifestyle  -     HCV Scrn ( [id number] 1xLife); Future    Need for vaccination  -     Influenza Vaccine, High Dose (65+ Only)          Return in about 6 months (around 2020), or after May 23, 2020, for hyperlipidemia.  This note was created using voice recognition software (Dragon). The accuracy of the dictation is limited by the abilities of the software. I have reviewed the note prior to signing, however some errors in grammar and context are still possible. If you have any questions related to this note " please do not hesitate to contact our office.

## 2019-12-18 ENCOUNTER — HOSPITAL ENCOUNTER (OUTPATIENT)
Dept: LAB | Facility: MEDICAL CENTER | Age: 69
End: 2019-12-18
Attending: FAMILY MEDICINE
Payer: MEDICARE

## 2019-12-18 DIAGNOSIS — Z72.89 OTHER PROBLEMS RELATED TO LIFESTYLE: ICD-10-CM

## 2019-12-18 DIAGNOSIS — E03.9 ACQUIRED HYPOTHYROIDISM: ICD-10-CM

## 2019-12-18 LAB
HCV AB S/CO SERPL IA: NEGATIVE
T3FREE SERPL-MCNC: 3.4 PG/ML (ref 2.4–4.2)
T4 FREE SERPL-MCNC: 0.89 NG/DL (ref 0.53–1.43)
TSH SERPL DL<=0.005 MIU/L-ACNC: 3.33 UIU/ML (ref 0.38–5.33)

## 2019-12-18 PROCEDURE — 84439 ASSAY OF FREE THYROXINE: CPT

## 2019-12-18 PROCEDURE — 36415 COLL VENOUS BLD VENIPUNCTURE: CPT

## 2019-12-18 PROCEDURE — 84481 FREE ASSAY (FT-3): CPT

## 2019-12-18 PROCEDURE — 84443 ASSAY THYROID STIM HORMONE: CPT

## 2019-12-18 PROCEDURE — G0472 HEP C SCREEN HIGH RISK/OTHER: HCPCS

## 2020-02-14 DIAGNOSIS — E03.9 ACQUIRED HYPOTHYROIDISM: ICD-10-CM

## 2020-02-14 RX ORDER — LEVOTHYROXINE SODIUM 0.05 MG/1
50 TABLET ORAL
Qty: 90 TAB | Refills: 0 | Status: SHIPPED | OUTPATIENT
Start: 2020-02-14 | End: 2020-04-29 | Stop reason: SDUPTHER

## 2020-03-12 ENCOUNTER — OFFICE VISIT (OUTPATIENT)
Dept: URGENT CARE | Facility: CLINIC | Age: 70
End: 2020-03-12
Payer: MEDICARE

## 2020-03-12 VITALS
RESPIRATION RATE: 16 BRPM | HEART RATE: 89 BPM | OXYGEN SATURATION: 95 % | HEIGHT: 68 IN | SYSTOLIC BLOOD PRESSURE: 138 MMHG | BODY MASS INDEX: 28.04 KG/M2 | WEIGHT: 185 LBS | TEMPERATURE: 97.2 F | DIASTOLIC BLOOD PRESSURE: 80 MMHG

## 2020-03-12 DIAGNOSIS — R05.9 COUGH: ICD-10-CM

## 2020-03-12 DIAGNOSIS — J40 BRONCHITIS: ICD-10-CM

## 2020-03-12 PROCEDURE — 99214 OFFICE O/P EST MOD 30 MIN: CPT | Performed by: NURSE PRACTITIONER

## 2020-03-13 ENCOUNTER — TELEPHONE (OUTPATIENT)
Dept: URGENT CARE | Facility: CLINIC | Age: 70
End: 2020-03-13

## 2020-03-13 DIAGNOSIS — J40 BRONCHITIS: ICD-10-CM

## 2020-03-13 RX ORDER — ALBUTEROL SULFATE 90 UG/1
2 AEROSOL, METERED RESPIRATORY (INHALATION) EVERY 6 HOURS PRN
Qty: 8.5 G | Refills: 0 | Status: SHIPPED | OUTPATIENT
Start: 2020-03-13 | End: 2020-04-29

## 2020-03-13 ASSESSMENT — ENCOUNTER SYMPTOMS
HEADACHES: 0
PND: 0
EYE PAIN: 0
CLAUDICATION: 0
PALPITATIONS: 0
CHILLS: 0
DIZZINESS: 0
SHORTNESS OF BREATH: 1
FEVER: 0
BACK PAIN: 1
WHEEZING: 0
NAUSEA: 0
SORE THROAT: 0
COUGH: 1
VOMITING: 0
ORTHOPNEA: 0
MYALGIAS: 0

## 2020-03-13 NOTE — PROGRESS NOTES
"Subjective:   Jason Nation  is a 69 y.o. female who presents for Shortness of Breath (pain in her sternum when she breathes)        Cough   This is a new problem. The current episode started yesterday. The problem has been unchanged. The problem occurs constantly. The cough is non-productive. Associated symptoms include shortness of breath. Pertinent negatives include no chest pain, chills, fever, headaches, myalgias, rash, sore throat or wheezing. Associated symptoms comments: Chest tightness  . Nothing aggravates the symptoms. She has tried nothing for the symptoms. The treatment provided no relief. Her past medical history is significant for asthma and bronchitis.     Review of Systems   Constitutional: Negative for chills and fever.   HENT: Negative for sore throat.    Eyes: Negative for pain.   Respiratory: Positive for cough and shortness of breath. Negative for wheezing.    Cardiovascular: Negative for chest pain, palpitations, orthopnea, claudication and PND.   Gastrointestinal: Negative for nausea and vomiting.   Genitourinary: Negative for hematuria.   Musculoskeletal: Positive for back pain. Negative for myalgias.   Skin: Negative for rash.   Neurological: Negative for dizziness and headaches.     Allergies   Allergen Reactions   • Fish Allergy Diarrhea     Diarrhea, upset stomach   • Tape Rash     Redness and rash      Objective:   /80   Pulse 89   Temp 36.2 °C (97.2 °F)   Resp 16   Ht 1.715 m (5' 7.5\")   Wt 83.9 kg (185 lb)   SpO2 95%   BMI 28.55 kg/m²   Physical Exam  Vitals signs and nursing note reviewed.   Constitutional:       General: She is not in acute distress.     Appearance: She is well-developed.   HENT:      Head: Normocephalic and atraumatic.      Right Ear: Tympanic membrane and external ear normal.      Left Ear: Tympanic membrane and external ear normal.      Nose: Nose normal.      Right Sinus: No maxillary sinus tenderness or frontal sinus tenderness.      Left Sinus: " No maxillary sinus tenderness or frontal sinus tenderness.      Mouth/Throat:      Mouth: Mucous membranes are moist.      Pharynx: Uvula midline. No posterior oropharyngeal erythema.      Tonsils: No tonsillar exudate or tonsillar abscesses.   Eyes:      General:         Right eye: No discharge.         Left eye: No discharge.      Conjunctiva/sclera: Conjunctivae normal.      Pupils: Pupils are equal, round, and reactive to light.   Cardiovascular:      Rate and Rhythm: Normal rate and regular rhythm.      Heart sounds: No murmur.   Pulmonary:      Effort: Pulmonary effort is normal. No respiratory distress.      Breath sounds: Normal breath sounds.   Abdominal:      General: There is no distension.      Palpations: Abdomen is soft.      Tenderness: There is no abdominal tenderness.   Musculoskeletal: Normal range of motion.   Skin:     General: Skin is warm and dry.   Neurological:      General: No focal deficit present.      Mental Status: She is alert and oriented to person, place, and time. Mental status is at baseline.      Gait: Gait (gait at baseline) normal.   Psychiatric:         Judgment: Judgment normal.           Assessment/Plan:     1. Bronchitis  albuterol 108 (90 Base) MCG/ACT Aero Soln inhalation aerosol   2. Cough  EKG - Clinic Performed    albuterol 108 (90 Base) MCG/ACT Aero Soln inhalation aerosol     Interpreted by me    Rate: Normal  Rhythm: Normal sinus   Axis: Normal  Interval/Conduction: Normal  Enlargement: None    Ischemia:      ST Segments: no acute change      T Waves: no acute change      Q Waves: none    Comparison: NONE    Clinical Impression: no acute changes and normal EKG, see scanned sheet    Patient is a well-appearing 69-year-old who present with stated above, EKG normal, vital signs stable, patient does not appear to be having any ACS. patient having chest tightness will trial albuterol inhaler  Advised to continue supportive care with Tylenol and/or ibuprofen for fevers and  discomfort. Increased fluids and electrolytes.   Differential diagnosis, natural history, supportive care, and indications for immediate follow-up discussed.

## 2020-03-16 ENCOUNTER — TELEPHONE (OUTPATIENT)
Dept: MEDSURG UNIT | Facility: MEDICAL CENTER | Age: 70
End: 2020-03-16

## 2020-03-16 NOTE — TELEPHONE ENCOUNTER
1. Caller Name: Jason Nation          Call Back Number: [phone]    [facility] PCP or Specialty Provider: Yes         2.  Does patient have any active symptoms of respiratory illness (fever OR cough OR shortness of breath)? Yes, the patient reports the following respiratory symptoms: shortness of breath. Went to  last Thursday not at risk for having COVID19. Was diagnosed with Bronchitis.       3.  Does patient have any comoribidities? None     4.  In the last 30 days, has the patient traveled outside of the country OR in a high risk area within the  OR have any known contact with someone who has or is suspected to have COVID-19?  No.    5. Disposition: Advised to schedule with their insurance's preferred virtual visit provider to limit potential exposure to others;   [place]   [place] [id number] Will call now to get ear ache adressed.    Note routed to PCP: FYI only.

## 2020-03-24 ENCOUNTER — TELEPHONE (OUTPATIENT)
Dept: URGENT CARE | Facility: PHYSICIAN GROUP | Age: 70
End: 2020-03-24

## 2020-03-24 NOTE — TELEPHONE ENCOUNTER
Pt is in self isolation due to cold/flu symptoms. She was sent to [facility] per triage nurse. She had an upsetting experience. She is now experiencing new symptoms and would like to do a tele visit. Please contact her as soon as you can.

## 2020-03-24 NOTE — TELEPHONE ENCOUNTER
Jason Saini is out of the office this week.  We have not been able to set up video conferencing yet in family practice.  [facility], could do a telephone visit with you. Let us know if this is something you would like to do.  Unfortunately, Medicare does not have a teleconference contractor we could send you to at this time.  ARUN Barriga.

## 2020-03-26 NOTE — TELEPHONE ENCOUNTER
Patient stated that she's feeling better and is willing to wait it out to see if she truly needs a follow up appointment. She will call us back in 3 weeks to let us know how she's doing. Until then, she will come in on her scheduled appt on 05/26/2020 with Dr. Saini.

## 2020-03-26 NOTE — TELEPHONE ENCOUNTER
Phone Number Called: [phone] (home)       Call outcome: Did not leave a detailed message. Requested patient to call back.    Message: If patient calls back, ask patient if she's feeling better. Dr. Partida does not mind doing a phone visit as a follow up if patient is feeling better. If not, patient would need to go back to urgent care.

## 2020-04-29 ENCOUNTER — TELEMEDICINE (OUTPATIENT)
Dept: ENDOCRINOLOGY | Facility: MEDICAL CENTER | Age: 70
End: 2020-04-29
Payer: MEDICARE

## 2020-04-29 VITALS
SYSTOLIC BLOOD PRESSURE: 124 MMHG | DIASTOLIC BLOOD PRESSURE: 70 MMHG | HEIGHT: 68 IN | BODY MASS INDEX: 27.43 KG/M2 | WEIGHT: 181 LBS

## 2020-04-29 DIAGNOSIS — R53.83 FATIGUE, UNSPECIFIED TYPE: ICD-10-CM

## 2020-04-29 DIAGNOSIS — E53.8 VITAMIN B 12 DEFICIENCY: ICD-10-CM

## 2020-04-29 DIAGNOSIS — E55.9 VITAMIN D DEFICIENCY: ICD-10-CM

## 2020-04-29 DIAGNOSIS — E03.9 ACQUIRED HYPOTHYROIDISM: ICD-10-CM

## 2020-04-29 PROCEDURE — 99443 PR PHYSICIAN TELEPHONE EVALUATION 21-30 MIN: CPT | Performed by: INTERNAL MEDICINE

## 2020-04-29 RX ORDER — LIOTHYRONINE SODIUM 5 UG/1
5 TABLET ORAL
Qty: 180 TAB | Refills: 3 | Status: SHIPPED | OUTPATIENT
Start: 2020-04-29 | End: 2021-02-05 | Stop reason: SDUPTHER

## 2020-04-29 RX ORDER — LEVOTHYROXINE SODIUM 0.05 MG/1
TABLET ORAL
Qty: 102 TAB | Refills: 3 | Status: SHIPPED | OUTPATIENT
Start: 2020-04-29 | End: 2021-01-04

## 2020-05-19 ENCOUNTER — HOSPITAL ENCOUNTER (OUTPATIENT)
Dept: LAB | Facility: MEDICAL CENTER | Age: 70
End: 2020-05-19
Attending: FAMILY MEDICINE
Payer: MEDICARE

## 2020-05-19 DIAGNOSIS — E78.2 MIXED HYPERLIPIDEMIA: ICD-10-CM

## 2020-05-19 LAB
CHOLEST SERPL-MCNC: 186 MG/DL (ref 100–199)
FASTING STATUS PATIENT QL REPORTED: NORMAL
HDLC SERPL-MCNC: 34 MG/DL
LDLC SERPL CALC-MCNC: 123 MG/DL
TRIGL SERPL-MCNC: 146 MG/DL (ref 0–149)

## 2020-05-19 PROCEDURE — 80061 LIPID PANEL: CPT

## 2020-05-19 PROCEDURE — 36415 COLL VENOUS BLD VENIPUNCTURE: CPT

## 2020-05-26 ENCOUNTER — OFFICE VISIT (OUTPATIENT)
Dept: MEDICAL GROUP | Facility: PHYSICIAN GROUP | Age: 70
End: 2020-05-26
Payer: MEDICARE

## 2020-05-26 VITALS
DIASTOLIC BLOOD PRESSURE: 70 MMHG | HEIGHT: 68 IN | RESPIRATION RATE: 12 BRPM | HEART RATE: 80 BPM | BODY MASS INDEX: 28.04 KG/M2 | SYSTOLIC BLOOD PRESSURE: 142 MMHG | WEIGHT: 185 LBS | OXYGEN SATURATION: 94 % | TEMPERATURE: 98.6 F

## 2020-05-26 DIAGNOSIS — K21.9 GASTROESOPHAGEAL REFLUX DISEASE WITHOUT ESOPHAGITIS: ICD-10-CM

## 2020-05-26 DIAGNOSIS — E03.9 ACQUIRED HYPOTHYROIDISM: ICD-10-CM

## 2020-05-26 PROBLEM — G47.33 OSA (OBSTRUCTIVE SLEEP APNEA): Status: RESOLVED | Noted: 2018-01-03 | Resolved: 2020-05-26

## 2020-05-26 PROCEDURE — 99214 OFFICE O/P EST MOD 30 MIN: CPT | Performed by: FAMILY MEDICINE

## 2020-05-26 ASSESSMENT — PATIENT HEALTH QUESTIONNAIRE - PHQ9: CLINICAL INTERPRETATION OF PHQ2 SCORE: 0

## 2020-05-26 NOTE — PROGRESS NOTES
cc: Asthma    Subjective:     Jason Nation is a 70 y.o. female presenting for further evaluation she been experiencing epigastric pain and is concerned that she may have asthma exacerbation    Acquired hypothyroidism  Chronic diagnosis.  Continues to follow-up with endocrinology.  Currently taking levothyroxine 50mcg daily. and 100mcg on Sunday. Taking cytomel 5mcg bid.  Last appt with endocrine, Dr Grimaldo, was on 4/29.  Previous labs from December 2019 TSH normal 3.3, free T4 0.9, free T3 3.40.    GERD  Chronic diagnosis.  GERD in the past.  Had an endoscopy last year with findings of hiatal hernia.  Taking Tums at home and has been .  Weight has been stable.  She went to urgent care in March and was diagnosed with bronchitis.  Was normal as prescribed as needed albuterol.  Currently tolerating the albuterol for 4 days.  Feels good.  Has noticed some difficulty breathing.  She does have a history of asthma diagnosed.  Also triggered by cat hair.    Vaccines discussed with patient and patient will receive the pneumovax when she's feeling better.    Review of systems:  See above and negative for fevers, chills, cough.  Allergies   Allergen Reactions   • Fish Allergy Diarrhea     Diarrhea, upset stomach   • Tape Rash     Redness and rash         Current Outpatient Medications:   •  Zinc Sulfate (ZINC 15 PO), Take  by mouth., Disp: , Rfl:   •  liothyronine (CYTOMEL) 5 MCG Tab, Take 1 Tab by mouth 2 Times a Day., Disp: 180 Tab, Rfl: 3  •  levothyroxine (SYNTHROID) 50 MCG Tab, 1 tab daily From Monday to Saturday of the week and two tablets daily on every Sunday of the week., Disp: 102 Tab, Rfl: 3  •  VITAMIN K PO, Take  by mouth every 3 days., Disp: , Rfl:   •  Omega-3 1000 MG Cap, Take  by mouth every 3 days., Disp: , Rfl:   •  Flaxseed, Linseed, (FLAXSEED OIL PO), Take  by mouth., Disp: , Rfl:   •  Cholecalciferol (VITAMIN D3) 5000 units Tab, Take  by mouth every day., Disp: , Rfl:   •  MULTIPLE VITAMIN  "PO, Take  by mouth every day., Disp: , Rfl:   •  cyanocobalamin (VITAMIN B-12) 100 MCG Tab, Take 100 mcg by mouth every 3 days., Disp: , Rfl:   •  Calcium Carb-Cholecalciferol (CALCIUM 1000 + D PO), Take  by mouth every day., Disp: , Rfl:     Allergies, past medical history, past surgical history, family history, social history reviewed and updated    Objective:     Vitals: /70 (BP Location: Right arm, Patient Position: Sitting, BP Cuff Size: Adult)   Pulse 80   Temp 37 °C (98.6 °F) (Temporal)   Resp 12   Ht 1.715 m (5' 7.5\")   Wt 83.9 kg (185 lb)   SpO2 94%   BMI 28.55 kg/m²   General:  Alert, pleasant, NAD  Eyes:  normal inspection of conjunctivae and lids, EOMI,   ENMT:  External ears and nose are normal.    Neck  supple,   Heart:  Regular rate and rhythm,  No LE edema  Respiratory:  Normal respiratory effort,Clear to auscultation bilaterally.  Abdomen:   soft, Non-distended,   Skin:  Warm, dry, no rashes,   Musculoskeletal:  Normal gait, Normal digits and nails.  Neurological: No tremors,   Psych:   Affect/mood is normal, judgement is good, memory is intact, grooming is appropriate.    Assessment/Plan:     Jason was seen today for follow-up and asthma.    Diagnoses and all orders for this visit:    Acquired hypothyroidism  Chronic medical diagnosis.  Stable.  Continue with levothyroxine and Cytomel as prescribed by endocrinology.    Gastroesophageal reflux disease without esophagitis  Chronic medical diagnosis.  She has been off medications for some time now.  Did have a recent endoscopy in November with findings of a small hiatal hernia.  Encouraged to monitor symptoms and to start over-the-counter Pepcid.  Discussed with patient if no improvement is as well as her GI doctor .  Reassured her that at this point her lung examination is benign  -     CBC WITH DIFFERENTIAL; Future  -     Comp Metabolic Panel; Future          Return in about 4 months (around 9/26/2020) for Preventative / " Physical.  This note was created using voice recognition software (Dragon). The accuracy of the dictation is limited by the abilities of the software. I have reviewed the note prior to signing, however some errors in grammar and context are still possible. If you have any questions related to this note please do not hesitate to contact our office.

## 2020-05-26 NOTE — LETTER
[facility]  Meera Saini M.D.  [address] [place] [address]  Fax: [phone]   Authorization for Release/Disclosure of   Protected Health Information   Name: JARED NATION : 1950 SSN: [id number]   Address: [facility] [address] [place] [address] Phone:    [phone] (home)    I authorize the entity listed below to release/disclose the PHI below to:   [facility]/Meera Saini M.D. and Meera Saini M.D.   Provider or Entity Name:  [facility]   Address   City, State, Zip   Phone:      Fax:     Reason for request: continuity of care   Information to be released:    [  ] LAST COLONOSCOPY,  including any PATH REPORT and follow-up  [  ] LAST FIT/COLOGUARD RESULT [  ] LAST DEXA  [ y ] LAST MAMMOGRAM  [  ] LAST PAP  [  ] LAST LABS [  ] RETINA EXAM REPORT  [  ] IMMUNIZATION RECORDS  [  ] Release all info      [  ] Check here and initial the line next to each item to release ALL health information INCLUDING  _____ Care and treatment for drug and / or alcohol abuse  _____ HIV testing, infection status, or AIDS  _____ Genetic Testing    DATES OF SERVICE OR TIME PERIOD TO BE DISCLOSED: _____________  I understand and acknowledge that:  * This Authorization may be revoked at any time by you in writing, except if your health information has already been used or disclosed.  * Your health information that will be used or disclosed as a result of you signing this authorization could be re-disclosed by the recipient. If this occurs, your re-disclosed health information may no longer be protected by State or Federal laws.  * You may refuse to sign this Authorization. Your refusal will not affect your ability to obtain treatment.  * This Authorization becomes effective upon signing and will  on (date) __________.      If no date is indicated, this Authorization will  one (1) year from the signature date.    Name: Jared Nation    Signature:   Date:     2020       PLEASE FAX  REQUESTED RECORDS BACK TO: [phone]

## 2020-07-30 ENCOUNTER — HOSPITAL ENCOUNTER (OUTPATIENT)
Dept: LAB | Facility: MEDICAL CENTER | Age: 70
End: 2020-07-30
Attending: INTERNAL MEDICINE
Payer: MEDICARE

## 2020-07-30 DIAGNOSIS — E03.9 ACQUIRED HYPOTHYROIDISM: ICD-10-CM

## 2020-07-30 DIAGNOSIS — E53.8 VITAMIN B 12 DEFICIENCY: ICD-10-CM

## 2020-07-30 DIAGNOSIS — K21.9 GASTROESOPHAGEAL REFLUX DISEASE WITHOUT ESOPHAGITIS: ICD-10-CM

## 2020-07-30 DIAGNOSIS — E55.9 VITAMIN D DEFICIENCY: ICD-10-CM

## 2020-07-30 LAB
25(OH)D3 SERPL-MCNC: 76 NG/ML (ref 30–100)
ALBUMIN SERPL BCP-MCNC: 4.1 G/DL (ref 3.2–4.9)
ALBUMIN/GLOB SERPL: 1.5 G/DL
ALP SERPL-CCNC: 77 U/L (ref 30–99)
ALT SERPL-CCNC: 17 U/L (ref 2–50)
ANION GAP SERPL CALC-SCNC: 14 MMOL/L (ref 7–16)
AST SERPL-CCNC: 17 U/L (ref 12–45)
BASOPHILS # BLD AUTO: 0.5 % (ref 0–1.8)
BASOPHILS # BLD: 0.04 K/UL (ref 0–0.12)
BILIRUB SERPL-MCNC: 0.8 MG/DL (ref 0.1–1.5)
BUN SERPL-MCNC: 18 MG/DL (ref 8–22)
CALCIUM SERPL-MCNC: 9.1 MG/DL (ref 8.5–10.5)
CHLORIDE SERPL-SCNC: 101 MMOL/L (ref 96–112)
CO2 SERPL-SCNC: 25 MMOL/L (ref 20–33)
CREAT SERPL-MCNC: 0.81 MG/DL (ref 0.5–1.4)
EOSINOPHIL # BLD AUTO: 0.09 K/UL (ref 0–0.51)
EOSINOPHIL NFR BLD: 1.2 % (ref 0–6.9)
ERYTHROCYTE [DISTWIDTH] IN BLOOD BY AUTOMATED COUNT: 44.8 FL (ref 35.9–50)
FASTING STATUS PATIENT QL REPORTED: NORMAL
GLOBULIN SER CALC-MCNC: 2.7 G/DL (ref 1.9–3.5)
GLUCOSE SERPL-MCNC: 104 MG/DL (ref 65–99)
HCT VFR BLD AUTO: 46.5 % (ref 37–47)
HGB BLD-MCNC: 15 G/DL (ref 12–16)
IMM GRANULOCYTES # BLD AUTO: 0.02 K/UL (ref 0–0.11)
IMM GRANULOCYTES NFR BLD AUTO: 0.3 % (ref 0–0.9)
LYMPHOCYTES # BLD AUTO: 2.38 K/UL (ref 1–4.8)
LYMPHOCYTES NFR BLD: 31.1 % (ref 22–41)
MCH RBC QN AUTO: 29.7 PG (ref 27–33)
MCHC RBC AUTO-ENTMCNC: 32.3 G/DL (ref 33.6–35)
MCV RBC AUTO: 92.1 FL (ref 81.4–97.8)
MONOCYTES # BLD AUTO: 0.39 K/UL (ref 0–0.85)
MONOCYTES NFR BLD AUTO: 5.1 % (ref 0–13.4)
NEUTROPHILS # BLD AUTO: 4.73 K/UL (ref 2–7.15)
NEUTROPHILS NFR BLD: 61.8 % (ref 44–72)
NRBC # BLD AUTO: 0 K/UL
NRBC BLD-RTO: 0 /100 WBC
PLATELET # BLD AUTO: 258 K/UL (ref 164–446)
PMV BLD AUTO: 11.8 FL (ref 9–12.9)
POTASSIUM SERPL-SCNC: 4.3 MMOL/L (ref 3.6–5.5)
PROT SERPL-MCNC: 6.8 G/DL (ref 6–8.2)
RBC # BLD AUTO: 5.05 M/UL (ref 4.2–5.4)
SODIUM SERPL-SCNC: 140 MMOL/L (ref 135–145)
T3 SERPL-MCNC: 106 NG/DL (ref 60–181)
T3FREE SERPL-MCNC: 3.12 PG/ML (ref 2–4.4)
T4 FREE SERPL-MCNC: 1.18 NG/DL (ref 0.93–1.7)
TSH SERPL DL<=0.005 MIU/L-ACNC: 2.41 UIU/ML (ref 0.38–5.33)
VIT B12 SERPL-MCNC: 750 PG/ML (ref 211–911)
WBC # BLD AUTO: 7.7 K/UL (ref 4.8–10.8)

## 2020-07-30 PROCEDURE — 82306 VITAMIN D 25 HYDROXY: CPT

## 2020-07-30 PROCEDURE — 84443 ASSAY THYROID STIM HORMONE: CPT

## 2020-07-30 PROCEDURE — 80053 COMPREHEN METABOLIC PANEL: CPT

## 2020-07-30 PROCEDURE — 84480 ASSAY TRIIODOTHYRONINE (T3): CPT

## 2020-07-30 PROCEDURE — 82607 VITAMIN B-12: CPT

## 2020-07-30 PROCEDURE — 36415 COLL VENOUS BLD VENIPUNCTURE: CPT

## 2020-07-30 PROCEDURE — 84439 ASSAY OF FREE THYROXINE: CPT

## 2020-07-30 PROCEDURE — 84481 FREE ASSAY (FT-3): CPT

## 2020-07-30 PROCEDURE — 85025 COMPLETE CBC W/AUTO DIFF WBC: CPT

## 2020-08-02 NOTE — PROGRESS NOTES
Endocrinology Clinic Progress Note  PCP: Meera Saini M.D.    HPI:  Jason Nation is a 70 y.o. old patient who comes in today for review of endocrine problems.    Hypothyroidism:  Currently taking Levothyroxine 50 mcg 6 days/week and 100 mcg on 7th day.  Her average daily dose this way is around 57 mcg daily.  She is also on 10 mcg of liothyronne daily.  Results for JASON NATION (MRN [id number]) as of 8/2/2020 10:30   Ref. Range 7/30/2020 12:09   TSH Latest Ref Range: 0.380 - 5.330 uIU/mL 2.410   Free T-4 Latest Ref Range: 0.93 - 1.70 ng/dL 1.18   T3 Latest Ref Range: 60.0 - 181.0 ng/dL 106.0   T3,Free Latest Ref Range: 2.00 - 4.40 pg/mL 3.12     Vitamin D Deficiency:  Currently taking Vitamin D 5000 units daily.  Results for JASON NATION (MRN [id number]) as of 8/2/2020 10:30   Ref. Range 7/30/2020 12:09   25-Hydroxy   Vitamin D 25 Latest Ref Range: 30 - 100 ng/mL 76     Fatigue:  Feels like it could be stress related and not sleeping well.    ROS:  Constitutional: fatigue,No unintentional weight loss  Endo: Denies excessive thirst or frequent urination  All other systems were reviewed and were negative.    Past Medical History:  Patient Active Problem List    Diagnosis Date Noted   • Gastroesophageal reflux disease without esophagitis 05/26/2020   • Restless legs 05/23/2019   • Mixed hyperlipidemia 04/10/2019   • Acquired hypothyroidism 12/12/2017   • IGT (impaired glucose tolerance) 12/12/2017       Medications:    Current Outpatient Medications:   •  Quercetin 250 MG Tab, Take  by mouth., Disp: , Rfl:   •  levothyroxine (SYNTHROID) 75 MCG Tab, Take 1 Tab by mouth Every morning on an empty stomach., Disp: 90 Tab, Rfl: 3  •  Zinc Sulfate (ZINC 15 PO), Take 45 mg by mouth every day., Disp: , Rfl:   •  liothyronine (CYTOMEL) 5 MCG Tab, Take 1 Tab by mouth 2 Times a Day., Disp: 180 Tab, Rfl: 3  •  levothyroxine (SYNTHROID) 50 MCG Tab, 1 tab daily From Monday to Saturday of the week and two tablets daily on  "every Sunday of the week., Disp: 102 Tab, Rfl: 3  •  VITAMIN K PO, Take  by mouth every 3 days., Disp: , Rfl:   •  Omega-3 1000 MG Cap, Take  by mouth every 3 days., Disp: , Rfl:   •  Flaxseed, Linseed, (FLAXSEED OIL PO), Take  by mouth., Disp: , Rfl:   •  Cholecalciferol (VITAMIN D3) 5000 units Tab, Take  by mouth every day., Disp: , Rfl:   •  MULTIPLE VITAMIN PO, Take  by mouth every day., Disp: , Rfl:   •  cyanocobalamin (VITAMIN B-12) 100 MCG Tab, Take 100 mcg by mouth every 3 days., Disp: , Rfl:   •  Calcium Carb-Cholecalciferol (CALCIUM 1000 + D PO), Take  by mouth every day., Disp: , Rfl:     Labs: Reviewed    Physical Examination:  Vital signs: /60   Pulse 85   Ht 1.715 m (5' 7.5\")   Wt 81.6 kg (180 lb)   SpO2 96%   BMI 27.78 kg/m²  Body mass index is 27.78 kg/m². Patient's body mass index is 27.78 kg/m².   General: No apparent distress, cooperative  Eyes: No scleral icterus or discharge  ENMT: Normal on external inspection of nose, lips, normal thyroid exam  Neck: No abnormal masses on inspection  Resp: Normal effort, clear to auscultation bilaterally   CVS: Regular rate and rhythm, S1 S2 normal, no murmur   Extremities: No edema  Abdomen: abdominal obesity present  Neuro: Alert and oriented  Skin: No rash  Psych: Normal mood and affect, intact memory and able to make informed decisions    Assessment and Plan:    1. Acquired hypothyroidism  Increase levothyroxine to 75 mcg daily.  continue 10 mcg of levothyroxine daily.    2. Vitamin D deficiency  Continue vitamin D with food as per HPI.    3. Fatigue, unspecified type  Multifactorial but could also be due to suboptimal dose of levothyroxine.  Plan as per assessment #1 should help.    Return in about 3 months (around 11/4/2020).    Thank you for allowing me to participate in the care of this patient.        CC:   Meera Saini M.D.    This note was created using voice recognition software (Dragon). The accuracy of the dictation is limited by the " abilities of the software. I have reviewed the note prior to signing, however some errors in grammar and context are still possible. If you have any questions related to this note please do not hesitate to contact our office.

## 2020-08-03 ENCOUNTER — NURSE TRIAGE (OUTPATIENT)
Dept: HEALTH INFORMATION MANAGEMENT | Facility: OTHER | Age: 70
End: 2020-08-03

## 2020-08-03 NOTE — TELEPHONE ENCOUNTER
1. Caller Name:Jason Nation                Call Back Number:[phone]  [facility] PCP or Specialty Provider: Yes         2.  In the last two weeks, has the patient had any new or worsening symptoms (not explained by alternative diagnosis)? Yes, the patient reports the following COVID-19 consistent symptoms: shortness of breath or difficulty breathing, for months, history of asthma.    3.  Does patient have any comoribidities? Asthma, GERD and hypothyroidism.     4.  Has the patient traveled in the last 14 days OR had any known contact with someone who is suspected or confirmed to have COVID-19?  No.    5. Disposition: Cleared by RN Triage as potential is low for COVID-19; OK to keep/schedule appointment, patient has been seen for this by PCP, who is aware of her bronchitis and is treating it.     Note routed to [facility] Provider: LIVE only.

## 2020-08-04 ENCOUNTER — OFFICE VISIT (OUTPATIENT)
Dept: ENDOCRINOLOGY | Facility: MEDICAL CENTER | Age: 70
End: 2020-08-04
Attending: INTERNAL MEDICINE
Payer: MEDICARE

## 2020-08-04 VITALS
BODY MASS INDEX: 27.28 KG/M2 | WEIGHT: 180 LBS | HEIGHT: 68 IN | SYSTOLIC BLOOD PRESSURE: 110 MMHG | DIASTOLIC BLOOD PRESSURE: 60 MMHG | OXYGEN SATURATION: 96 % | HEART RATE: 85 BPM

## 2020-08-04 DIAGNOSIS — E03.9 ACQUIRED HYPOTHYROIDISM: ICD-10-CM

## 2020-08-04 DIAGNOSIS — E53.8 VITAMIN B 12 DEFICIENCY: ICD-10-CM

## 2020-08-04 DIAGNOSIS — R53.83 FATIGUE, UNSPECIFIED TYPE: ICD-10-CM

## 2020-08-04 DIAGNOSIS — E55.9 VITAMIN D DEFICIENCY: ICD-10-CM

## 2020-08-04 PROCEDURE — 99212 OFFICE O/P EST SF 10 MIN: CPT | Performed by: INTERNAL MEDICINE

## 2020-08-04 PROCEDURE — 99214 OFFICE O/P EST MOD 30 MIN: CPT | Performed by: INTERNAL MEDICINE

## 2020-08-04 RX ORDER — LEVOTHYROXINE SODIUM 0.07 MG/1
75 TABLET ORAL
Qty: 90 TAB | Refills: 3 | Status: SHIPPED | OUTPATIENT
Start: 2020-08-04 | End: 2021-02-05 | Stop reason: SDUPTHER

## 2020-08-04 ASSESSMENT — FIBROSIS 4 INDEX: FIB4 SCORE: 1.15

## 2020-10-06 ENCOUNTER — OFFICE VISIT (OUTPATIENT)
Dept: URGENT CARE | Facility: PHYSICIAN GROUP | Age: 70
End: 2020-10-06
Payer: MEDICARE

## 2020-10-06 VITALS
HEART RATE: 80 BPM | TEMPERATURE: 98.8 F | BODY MASS INDEX: 26.52 KG/M2 | WEIGHT: 175 LBS | HEIGHT: 68 IN | RESPIRATION RATE: 14 BRPM | OXYGEN SATURATION: 97 % | SYSTOLIC BLOOD PRESSURE: 132 MMHG | DIASTOLIC BLOOD PRESSURE: 68 MMHG

## 2020-10-06 DIAGNOSIS — J45.909 REACTIVE AIRWAY DISEASE WITHOUT COMPLICATION, UNSPECIFIED ASTHMA SEVERITY, UNSPECIFIED WHETHER PERSISTENT: ICD-10-CM

## 2020-10-06 PROCEDURE — 99214 OFFICE O/P EST MOD 30 MIN: CPT | Performed by: PHYSICIAN ASSISTANT

## 2020-10-06 RX ORDER — PREDNISONE 20 MG/1
40 TABLET ORAL DAILY
Qty: 10 TAB | Refills: 0 | Status: SHIPPED | OUTPATIENT
Start: 2020-10-06 | End: 2020-10-11

## 2020-10-06 RX ORDER — FLUTICASONE PROPIONATE 50 MCG
2 SPRAY, SUSPENSION (ML) NASAL DAILY
Qty: 16 G | Refills: 0 | Status: SHIPPED | OUTPATIENT
Start: 2020-10-06 | End: 2021-01-04

## 2020-10-06 ASSESSMENT — FIBROSIS 4 INDEX: FIB4 SCORE: 1.15

## 2020-10-07 ASSESSMENT — ENCOUNTER SYMPTOMS
MYALGIAS: 0
SORE THROAT: 0
SHORTNESS OF BREATH: 0
ABDOMINAL PAIN: 0
HEADACHES: 0
CHILLS: 0
TINGLING: 0
FEVER: 0
DIARRHEA: 0
DIAPHORESIS: 0
EYE PAIN: 0
DIZZINESS: 0
PALPITATIONS: 0
HEARTBURN: 0
NAUSEA: 0
SINUS PAIN: 0
COUGH: 1
STRIDOR: 0
WHEEZING: 0
VOMITING: 0
SPUTUM PRODUCTION: 0

## 2020-10-07 NOTE — PROGRESS NOTES
Subjective:   Jason Nation is a 70 y.o. female who presents for Asthma (hurts to breathe. Using inhaler but doesn't seem to help)      HPI:  This is a very pleasant 70-year-old female presenting to the clinic with intermittent episodes of pain with deep breathing believed to be exacerbated by the recent poor air quality and smoke.  Patient [place].  She has been using her albuterol as needed which provides some improvement.  She has had a chronic nonproductive cough.  She denies any associated fevers, chills, myalgias or shortness of breath.  The patient does have a past medical history significant for seasonal allergies.  Currently she has not been taking any allergy medications.  She does describe some nasal congestion.    Review of Systems   Constitutional: Negative for chills, diaphoresis, fever and malaise/fatigue.   HENT: Positive for congestion. Negative for ear pain, sinus pain and sore throat.    Eyes: Negative for pain.   Respiratory: Positive for cough. Negative for sputum production, shortness of breath, wheezing and stridor.         Pain with deep breathing.   Cardiovascular: Negative for chest pain, palpitations and leg swelling.   Gastrointestinal: Negative for abdominal pain, diarrhea, heartburn, nausea and vomiting.   Musculoskeletal: Negative for myalgias.   Neurological: Negative for dizziness, tingling and headaches.   Endo/Heme/Allergies: Negative for environmental allergies.       Medications:    • CALCIUM 1000 + D PO  • cyanocobalamin Tabs  • FLAXSEED OIL PO  • fluticasone  • levothyroxine Tabs  • liothyronine Tabs  • MULTIPLE VITAMIN PO  • Omega-3 Caps  • predniSONE Tabs  • Quercetin Tabs  • Vitamin D3 Tabs  • VITAMIN K PO  • ZINC 15 PO    Allergies: Fish allergy and Tape    Problem List: Jason Nation has Acquired hypothyroidism; IGT (impaired glucose tolerance); Mixed hyperlipidemia; Restless legs; and Gastroesophageal reflux disease without esophagitis on their  "problem list.    Surgical History:  Past Surgical History:   Procedure Laterality Date   • CATARACT PHACO WITH IOL Right 11/7/2017    Procedure: CATARACT PHACO WITH IOL;  Surgeon: Keron Khanna M.D.;  Location: SURGERY SAME DAY [facility];  Service: Ophthalmology   • GYN SURGERY  1974    c/section   • PRIMARY C SECTION         Past Social Hx: Jason Nation  reports that she has never smoked. She has never used smokeless tobacco. She reports current alcohol use. She reports that she does not use drugs.     Past Family Hx:  Jason Nation family history includes Cancer in her father, mother, paternal aunt, and paternal uncle.     Problem list, medications, and allergies reviewed by myself today in Epic.     Objective:     /68 (BP Location: Left arm, Patient Position: Sitting, BP Cuff Size: Adult)   Pulse 80   Temp 37.1 °C (98.8 °F) (Temporal)   Resp 14   Ht 1.715 m (5' 7.5\")   Wt 79.4 kg (175 lb)   SpO2 97%   BMI 27.00 kg/m²     Physical Exam  Constitutional:       General: She is not in acute distress.     Appearance: Normal appearance. She is not ill-appearing, toxic-appearing or diaphoretic.   HENT:      Head: Normocephalic and atraumatic.      Right Ear: Tympanic membrane, ear canal and external ear normal.      Left Ear: Tympanic membrane, ear canal and external ear normal.      Nose: Congestion present. No rhinorrhea.      Mouth/Throat:      Mouth: Mucous membranes are moist.      Pharynx: Posterior oropharyngeal erythema present. No oropharyngeal exudate.      Comments: Postnasal drip appreciated.  Eyes:      Conjunctiva/sclera: Conjunctivae normal.   Neck:      Musculoskeletal: Normal range of motion. No neck rigidity or muscular tenderness.   Cardiovascular:      Rate and Rhythm: Normal rate and regular rhythm.      Pulses: Normal pulses.      Heart sounds: Normal heart sounds.   Pulmonary:      Effort: Pulmonary effort is normal.      Breath sounds: Normal breath sounds. No wheezing, " rhonchi or rales.   Abdominal:      Palpations: Abdomen is soft.      Tenderness: There is no abdominal tenderness.   Lymphadenopathy:      Cervical: No cervical adenopathy.   Skin:     General: Skin is warm and dry.      Capillary Refill: Capillary refill takes less than 2 seconds.   Neurological:      General: No focal deficit present.      Mental Status: She is alert and oriented to person, place, and time. Mental status is at baseline.   Psychiatric:         Mood and Affect: Mood normal.         Thought Content: Thought content normal.           Assessment/Plan:     Diagnosis and associated orders:     1. Reactive airway disease without complication, unspecified asthma severity, unspecified whether persistent  predniSONE (DELTASONE) 20 MG Tab    fluticasone (FLONASE) 50 MCG/ACT nasal spray      Comments/MDM:       • Patient symptoms are consistent with reactive airway disease exacerbated by the recent smoke and poor quality.  She has been using her albuterol inhaler as directed with moderate relief.  She does have a past medical history significant for seasonal allergies.  I recommended her starting OTC Claritin/Zyrtec/Allegra 1 time daily.  Also recommended starting Flonase 2 sprays per nostril 1 time daily.  A contingent Medrol Dosepak was also provided shall symptoms not improve with this conservative treatment.  Discussed potential red flag symptoms and need for immediate follow-up.               Differential diagnosis, natural history, supportive care, and indications for immediate follow-up discussed.    Advised the patient to follow-up with the primary care physician for recheck, reevaluation, and consideration of further management.    Please note that this dictation was created using voice recognition software. I have made reasonable attempt to correct obvious errors, but I expect that there are errors of grammar and possibly content that I did not discover before finalizing the note.    This note was  electronically signed by LYNDSEY Sow PA-C

## 2020-12-18 DIAGNOSIS — R73.02 IGT (IMPAIRED GLUCOSE TOLERANCE): ICD-10-CM

## 2020-12-18 DIAGNOSIS — E78.2 MIXED HYPERLIPIDEMIA: ICD-10-CM

## 2020-12-23 ENCOUNTER — TELEPHONE (OUTPATIENT)
Dept: MEDICAL GROUP | Facility: PHYSICIAN GROUP | Age: 70
End: 2020-12-23

## 2020-12-28 ENCOUNTER — HOSPITAL ENCOUNTER (OUTPATIENT)
Dept: LAB | Facility: MEDICAL CENTER | Age: 70
End: 2020-12-28
Attending: FAMILY MEDICINE
Payer: MEDICARE

## 2020-12-28 DIAGNOSIS — E78.2 MIXED HYPERLIPIDEMIA: ICD-10-CM

## 2020-12-28 DIAGNOSIS — R73.02 IGT (IMPAIRED GLUCOSE TOLERANCE): ICD-10-CM

## 2020-12-28 LAB
ALBUMIN SERPL BCP-MCNC: 4.3 G/DL (ref 3.2–4.9)
ALBUMIN/GLOB SERPL: 1.5 G/DL
ALP SERPL-CCNC: 78 U/L (ref 30–99)
ALT SERPL-CCNC: 32 U/L (ref 2–50)
ANION GAP SERPL CALC-SCNC: 8 MMOL/L (ref 7–16)
AST SERPL-CCNC: 28 U/L (ref 12–45)
BILIRUB SERPL-MCNC: 0.6 MG/DL (ref 0.1–1.5)
BUN SERPL-MCNC: 17 MG/DL (ref 8–22)
CALCIUM SERPL-MCNC: 9.4 MG/DL (ref 8.5–10.5)
CHLORIDE SERPL-SCNC: 102 MMOL/L (ref 96–112)
CO2 SERPL-SCNC: 28 MMOL/L (ref 20–33)
CREAT SERPL-MCNC: 0.75 MG/DL (ref 0.5–1.4)
EST. AVERAGE GLUCOSE BLD GHB EST-MCNC: 114 MG/DL
GLOBULIN SER CALC-MCNC: 2.8 G/DL (ref 1.9–3.5)
GLUCOSE SERPL-MCNC: 101 MG/DL (ref 65–99)
HBA1C MFR BLD: 5.6 % (ref 0–5.6)
POTASSIUM SERPL-SCNC: 3.9 MMOL/L (ref 3.6–5.5)
PROT SERPL-MCNC: 7.1 G/DL (ref 6–8.2)
SODIUM SERPL-SCNC: 138 MMOL/L (ref 135–145)

## 2020-12-28 PROCEDURE — 83036 HEMOGLOBIN GLYCOSYLATED A1C: CPT | Mod: GA

## 2020-12-28 PROCEDURE — 80053 COMPREHEN METABOLIC PANEL: CPT

## 2020-12-28 PROCEDURE — 36415 COLL VENOUS BLD VENIPUNCTURE: CPT

## 2021-01-04 ENCOUNTER — OFFICE VISIT (OUTPATIENT)
Dept: MEDICAL GROUP | Facility: PHYSICIAN GROUP | Age: 71
End: 2021-01-04
Payer: MEDICARE

## 2021-01-04 VITALS
BODY MASS INDEX: 26.07 KG/M2 | SYSTOLIC BLOOD PRESSURE: 108 MMHG | OXYGEN SATURATION: 96 % | TEMPERATURE: 98.8 F | DIASTOLIC BLOOD PRESSURE: 68 MMHG | HEIGHT: 68 IN | WEIGHT: 172 LBS | HEART RATE: 88 BPM | RESPIRATION RATE: 16 BRPM

## 2021-01-04 DIAGNOSIS — R20.2 NUMBNESS AND TINGLING OF BOTH FEET: ICD-10-CM

## 2021-01-04 DIAGNOSIS — R20.2 NUMBNESS AND TINGLING IN BOTH HANDS: ICD-10-CM

## 2021-01-04 DIAGNOSIS — Z23 NEED FOR VACCINATION: ICD-10-CM

## 2021-01-04 DIAGNOSIS — R20.0 NUMBNESS AND TINGLING OF BOTH FEET: ICD-10-CM

## 2021-01-04 DIAGNOSIS — R20.0 NUMBNESS AND TINGLING IN BOTH HANDS: ICD-10-CM

## 2021-01-04 PROCEDURE — 90732 PPSV23 VACC 2 YRS+ SUBQ/IM: CPT | Performed by: FAMILY MEDICINE

## 2021-01-04 PROCEDURE — G0009 ADMIN PNEUMOCOCCAL VACCINE: HCPCS | Performed by: FAMILY MEDICINE

## 2021-01-04 PROCEDURE — 99214 OFFICE O/P EST MOD 30 MIN: CPT | Mod: 25 | Performed by: FAMILY MEDICINE

## 2021-01-04 ASSESSMENT — FIBROSIS 4 INDEX: FIB4 SCORE: 1.38

## 2021-01-04 ASSESSMENT — PATIENT HEALTH QUESTIONNAIRE - PHQ9: CLINICAL INTERPRETATION OF PHQ2 SCORE: 0

## 2021-01-04 NOTE — PROGRESS NOTES
CC: numbness                                                                                                                               Jason presents today for evaluation of numbness    Numbness and tingling in both hands and both feet.  New medical problem.  Started 3-4 weeks ago. Has been experiencing numbness and tingling in feet and hands.  States that she always has symptoms towards the end of the day especially in the evening.  The symptoms typically start after she has been sitting down for approximately 15 minutes.  They improve once she starts to stand up and move her hands and feet around.  They can vary from few minutes to longer.  Has never experienced this before.  Denies any bowel bladder dysfunction.  Denies any recent falls or any weakness.  She denies any decrease in strength to upper or lower extremities.  She was concerned about diabetes when the symptoms started in recent lab work is in normal range.  Her A1c has improved to 5.6%.  She has done some research on her own and is concerned that this might be 'neuropathy'.  She states that her thyroid dose was increased a few months ago by her endocrinologist at that time.  She has lab orders and has not had a chance to get these done.  Also states that he has left [facility] and she is not receiving return phone calls from their office.     Vaccines discussed with patient and would like the pneumovax today.    Patient Active Problem List    Diagnosis Date Noted   • Numbness and tingling in both hands 01/04/2021   • Numbness and tingling of both feet 01/04/2021   • Gastroesophageal reflux disease without esophagitis 05/26/2020   • Restless legs 05/23/2019   • Mixed hyperlipidemia 04/10/2019   • Acquired hypothyroidism 12/12/2017   • IGT (impaired glucose tolerance) 12/12/2017       Current Outpatient Medications   Medication Sig Dispense Refill   • VITAMIN D-VITAMIN K PO Take  by mouth.     • Quercetin 250 MG Tab Take  by mouth.     • levothyroxine  "(SYNTHROID) 75 MCG Tab Take 1 Tab by mouth Every morning on an empty stomach. 90 Tab 3   • Zinc Sulfate (ZINC 15 PO) Take 45 mg by mouth every day.     • liothyronine (CYTOMEL) 5 MCG Tab Take 1 Tab by mouth 2 Times a Day. 180 Tab 3   • Omega-3 1000 MG Cap Take  by mouth every 3 days.     • Flaxseed, Linseed, (FLAXSEED OIL PO) Take  by mouth.     • Cholecalciferol (VITAMIN D3) 5000 units Tab Take  by mouth every day.     • MULTIPLE VITAMIN PO Take  by mouth every day.     • cyanocobalamin (VITAMIN B-12) 100 MCG Tab Take 100 mcg by mouth every 3 days.       No current facility-administered medications for this visit.          Allergies as of 01/04/2021 - Reviewed 01/04/2021   Allergen Reaction Noted   • Fish allergy Diarrhea 04/10/2019   • Tape Rash 11/01/2017          /68 (BP Location: Right arm, Patient Position: Sitting, BP Cuff Size: Adult)   Pulse 88   Temp 37.1 °C (98.8 °F) (Temporal)   Resp 16   Ht 1.715 m (5' 7.5\")   Wt 78 kg (172 lb)   SpO2 96%   BMI 26.54 kg/m²     Physical Exam:  Gen:         Alert and oriented, No apparent distress.  Neck:        supple, No Lymphadenopathy  Lungs:     Clear to auscultation bilaterally  CV:          Regular rate and rhythm. no LE edema           Ext:          No clubbing, cyanosis  Neuro: normal upper and lower extremity strength.       Assessment and Plan.   70 y.o. female with the following issues.    Jason was seen today for follow-up and numbness.    Diagnoses and all orders for this visit:    Numbness and tingling in both hands  New problem.  Encouraged to get thyroid and B12 levels drawn.     Numbness and tingling of both feet  New medical problem.  Not well controlled.  Encouraged to get labs drawn and to establish with new PCP.    Need for vaccination  -     Pneumococal Polysaccharide Vaccine 23-Valent =>1YO SQ/IM        Follow up:    "

## 2021-01-06 ENCOUNTER — HOSPITAL ENCOUNTER (OUTPATIENT)
Dept: LAB | Facility: MEDICAL CENTER | Age: 71
End: 2021-01-06
Attending: INTERNAL MEDICINE
Payer: MEDICARE

## 2021-01-06 DIAGNOSIS — E55.9 VITAMIN D DEFICIENCY: ICD-10-CM

## 2021-01-06 DIAGNOSIS — E53.8 VITAMIN B 12 DEFICIENCY: ICD-10-CM

## 2021-01-06 DIAGNOSIS — E03.9 ACQUIRED HYPOTHYROIDISM: ICD-10-CM

## 2021-01-06 LAB
25(OH)D3 SERPL-MCNC: 87 NG/ML (ref 30–100)
T3 SERPL-MCNC: 104 NG/DL (ref 60–181)
T3FREE SERPL-MCNC: 3.03 PG/ML (ref 2–4.4)
T4 FREE SERPL-MCNC: 1.32 NG/DL (ref 0.93–1.7)
TSH SERPL DL<=0.005 MIU/L-ACNC: 1.59 UIU/ML (ref 0.38–5.33)
VIT B12 SERPL-MCNC: 936 PG/ML (ref 211–911)

## 2021-01-06 PROCEDURE — 84481 FREE ASSAY (FT-3): CPT

## 2021-01-06 PROCEDURE — 82607 VITAMIN B-12: CPT

## 2021-01-06 PROCEDURE — 84439 ASSAY OF FREE THYROXINE: CPT

## 2021-01-06 PROCEDURE — 84480 ASSAY TRIIODOTHYRONINE (T3): CPT

## 2021-01-06 PROCEDURE — 84443 ASSAY THYROID STIM HORMONE: CPT

## 2021-01-06 PROCEDURE — 82306 VITAMIN D 25 HYDROXY: CPT

## 2021-01-06 PROCEDURE — 36415 COLL VENOUS BLD VENIPUNCTURE: CPT

## 2021-01-13 ENCOUNTER — HOSPITAL ENCOUNTER (OUTPATIENT)
Dept: LAB | Facility: MEDICAL CENTER | Age: 71
End: 2021-01-13
Attending: FAMILY MEDICINE
Payer: MEDICARE

## 2021-01-13 ENCOUNTER — OFFICE VISIT (OUTPATIENT)
Dept: MEDICAL GROUP | Facility: LAB | Age: 71
End: 2021-01-13
Payer: MEDICARE

## 2021-01-13 VITALS
HEART RATE: 88 BPM | SYSTOLIC BLOOD PRESSURE: 120 MMHG | BODY MASS INDEX: 25.91 KG/M2 | TEMPERATURE: 98.7 F | WEIGHT: 171 LBS | OXYGEN SATURATION: 95 % | RESPIRATION RATE: 12 BRPM | HEIGHT: 68 IN | DIASTOLIC BLOOD PRESSURE: 76 MMHG

## 2021-01-13 DIAGNOSIS — Z12.31 ENCOUNTER FOR SCREENING MAMMOGRAM FOR BREAST CANCER: ICD-10-CM

## 2021-01-13 DIAGNOSIS — R63.8 OTHER SYMPTOMS AND SIGNS CONCERNING FOOD AND FLUID INTAKE: ICD-10-CM

## 2021-01-13 DIAGNOSIS — R20.2 NUMBNESS AND TINGLING OF BOTH FEET: ICD-10-CM

## 2021-01-13 DIAGNOSIS — R20.0 NUMBNESS AND TINGLING IN BOTH HANDS: ICD-10-CM

## 2021-01-13 DIAGNOSIS — R20.0 NUMBNESS AND TINGLING OF BOTH FEET: ICD-10-CM

## 2021-01-13 DIAGNOSIS — R20.2 NUMBNESS AND TINGLING IN BOTH HANDS: ICD-10-CM

## 2021-01-13 LAB
BASOPHILS # BLD AUTO: 0.5 % (ref 0–1.8)
BASOPHILS # BLD: 0.03 K/UL (ref 0–0.12)
EOSINOPHIL # BLD AUTO: 0.07 K/UL (ref 0–0.51)
EOSINOPHIL NFR BLD: 1.1 % (ref 0–6.9)
ERYTHROCYTE [DISTWIDTH] IN BLOOD BY AUTOMATED COUNT: 43 FL (ref 35.9–50)
FERRITIN SERPL-MCNC: 263 NG/ML (ref 10–291)
HCT VFR BLD AUTO: 47.2 % (ref 37–47)
HGB BLD-MCNC: 15.6 G/DL (ref 12–16)
IMM GRANULOCYTES # BLD AUTO: 0.02 K/UL (ref 0–0.11)
IMM GRANULOCYTES NFR BLD AUTO: 0.3 % (ref 0–0.9)
IRON SATN MFR SERPL: 27 % (ref 15–55)
IRON SERPL-MCNC: 74 UG/DL (ref 40–170)
LYMPHOCYTES # BLD AUTO: 1.98 K/UL (ref 1–4.8)
LYMPHOCYTES NFR BLD: 29.7 % (ref 22–41)
MCH RBC QN AUTO: 29.9 PG (ref 27–33)
MCHC RBC AUTO-ENTMCNC: 33.1 G/DL (ref 33.6–35)
MCV RBC AUTO: 90.4 FL (ref 81.4–97.8)
MONOCYTES # BLD AUTO: 0.36 K/UL (ref 0–0.85)
MONOCYTES NFR BLD AUTO: 5.4 % (ref 0–13.4)
NEUTROPHILS # BLD AUTO: 4.2 K/UL (ref 2–7.15)
NEUTROPHILS NFR BLD: 63 % (ref 44–72)
NRBC # BLD AUTO: 0 K/UL
NRBC BLD-RTO: 0 /100 WBC
PLATELET # BLD AUTO: 286 K/UL (ref 164–446)
PMV BLD AUTO: 11.6 FL (ref 9–12.9)
RBC # BLD AUTO: 5.22 M/UL (ref 4.2–5.4)
TIBC SERPL-MCNC: 272 UG/DL (ref 250–450)
UIBC SERPL-MCNC: 198 UG/DL (ref 110–370)
WBC # BLD AUTO: 6.7 K/UL (ref 4.8–10.8)

## 2021-01-13 PROCEDURE — 83540 ASSAY OF IRON: CPT

## 2021-01-13 PROCEDURE — 82728 ASSAY OF FERRITIN: CPT

## 2021-01-13 PROCEDURE — 99214 OFFICE O/P EST MOD 30 MIN: CPT | Performed by: FAMILY MEDICINE

## 2021-01-13 PROCEDURE — 85025 COMPLETE CBC W/AUTO DIFF WBC: CPT

## 2021-01-13 PROCEDURE — 36415 COLL VENOUS BLD VENIPUNCTURE: CPT

## 2021-01-13 PROCEDURE — 83550 IRON BINDING TEST: CPT

## 2021-01-13 RX ORDER — GABAPENTIN 100 MG/1
100 CAPSULE ORAL
Qty: 30 CAP | Refills: 2 | Status: SHIPPED | OUTPATIENT
Start: 2021-01-13 | End: 2021-02-16

## 2021-01-13 ASSESSMENT — ENCOUNTER SYMPTOMS
WEIGHT LOSS: 0
SORE THROAT: 0
DIAPHORESIS: 0
SHORTNESS OF BREATH: 0
FEVER: 0
CHILLS: 0
COUGH: 0
WHEEZING: 0
NAUSEA: 0
DIARRHEA: 0
VOMITING: 0

## 2021-01-13 ASSESSMENT — FIBROSIS 4 INDEX: FIB4 SCORE: 1.38

## 2021-01-13 NOTE — PROGRESS NOTES
"Jason Nation is a 70 y.o. female here to establish care and discuss numbness and tingling.    HPI:      Numbness and tingling  Happens intermittently at night - feels like hands and feet are asleep but \"hotter\". No weakness.  Improves or resolves with movement or massage  Workup with prior provider included normal A1c, normal B12.  Normal thyroid.    Current medicines (including changes today)  Current Outpatient Medications   Medication Sig Dispense Refill   • VITAMIN D-VITAMIN K PO Take  by mouth.     • Quercetin 250 MG Tab Take  by mouth.     • levothyroxine (SYNTHROID) 75 MCG Tab Take 1 Tab by mouth Every morning on an empty stomach. 90 Tab 3   • Zinc Sulfate (ZINC 15 PO) Take 45 mg by mouth every day.     • liothyronine (CYTOMEL) 5 MCG Tab Take 1 Tab by mouth 2 Times a Day. 180 Tab 3   • Omega-3 1000 MG Cap Take  by mouth every 3 days.     • Flaxseed, Linseed, (FLAXSEED OIL PO) Take  by mouth.     • Cholecalciferol (VITAMIN D3) 5000 units Tab Take  by mouth every day.     • MULTIPLE VITAMIN PO Take  by mouth every day.     • cyanocobalamin (VITAMIN B-12) 100 MCG Tab Take 100 mcg by mouth every 3 days.       No current facility-administered medications for this visit.      She  has a past medical history of Asthma, Breath shortness, Bronchitis, Cataract, Chickenpox, Disorder of thyroid, Telugu measles, Heart burn, Hyperlipidemia, Influenza, and Pneumonia (1982).  She  has a past surgical history that includes gyn surgery (1974); cataract phaco with iol (Right, 11/7/2017); and primary c section.  Social History     Tobacco Use   • Smoking status: Never Smoker   • Smokeless tobacco: Never Used   Substance Use Topics   • Alcohol use: Yes     Frequency: Monthly or less     Drinks per session: 1 or 2     Binge frequency: Never     Comment: rarely   • Drug use: No     Social History     Social History Narrative   • Not on file     Family History   Problem Relation Age of Onset   • Cancer Mother    • Cancer Father  " "  • Cancer Paternal Aunt    • Cancer Paternal Uncle    • Sleep Apnea Neg Hx      Family Status   Relation Name Status   • Mo   at age 77        pancreatic ca   • Fa   at age 77        leukemia   • PAunt  (Not Specified)   • PUnc  (Not Specified)   • Sis  Alive   • Bro half         sepsis   • Bro half         suicide   • Neg Hx  (Not Specified)       ROS  Review of Systems   Constitutional: Negative for chills, diaphoresis, fever, malaise/fatigue and weight loss.   HENT: Negative for congestion and sore throat.    Respiratory: Negative for cough, shortness of breath and wheezing.    Cardiovascular: Negative for chest pain.   Gastrointestinal: Negative for diarrhea, nausea and vomiting.       Objective:     Physical Exam:  /76 (BP Location: Right arm, Patient Position: Sitting, BP Cuff Size: Adult)   Pulse 88   Temp 37.1 °C (98.7 °F)   Resp 12   Ht 1.715 m (5' 7.5\")   Wt 77.6 kg (171 lb)   SpO2 95%  Body mass index is 26.39 kg/m².  Constitutional: Alert. Well appearing. No distress.  Skin: Warm, dry, good turgor, no visible rashes.  Eye: Equal, round and reactive to light, conjunctiva clear, lids normal.  ENMT: Moist mucous membranes.   Neck: Trachea midline, no masses, no thyromegaly.   Abdomen: Soft, non-tender, non-distended. No masses, no hepatosplenomegaly.  Monofilament testing with a 10 gram force: sensation intact: intact bilaterally  Visual Inspection: Feet without maceration, ulcers, fissures.  Pedal pulses: intact bilaterally  Neuro: Moves all four extremities. No facial droop.  Psych: Answers questions appropriately. Normal affect and mood.    Assessment and Plan:     1. Numbness and tingling in both hands  2. Numbness and tingling of both feet  New issue to me. Happens intermittently at night - feels like hands and feet are asleep but \"hotter\". Improves or resolves with movement or massage.  Monofilament sensation is intact to both feet. Workup with prior " provider included normal A1c, normal B12.  Normal thyroid.  Unclear if this is neuropathy versus atypical description of restless leg syndrome.  Checking CBC and iron studies as below.  Will try gabapentin as this could help with RLS or neuropathy.  - CBC WITH DIFFERENTIAL; Future  - IRON/TOTAL IRON BIND; Future  - FERRITIN; Future  - gabapentin (NEURONTIN) 100 MG Cap; Take 1 Cap by mouth every bedtime.  Dispense: 30 Cap; Refill: 2    Follow up: Return in about 1 month (around 2/13/2021).         PLEASE NOTE: This note was created using voice recognition software.

## 2021-01-15 DIAGNOSIS — Z23 NEED FOR VACCINATION: ICD-10-CM

## 2021-02-05 ENCOUNTER — PATIENT MESSAGE (OUTPATIENT)
Dept: MEDICAL GROUP | Facility: LAB | Age: 71
End: 2021-02-05

## 2021-02-05 DIAGNOSIS — E03.9 ACQUIRED HYPOTHYROIDISM: ICD-10-CM

## 2021-02-05 RX ORDER — LIOTHYRONINE SODIUM 5 UG/1
5 TABLET ORAL
Qty: 180 TAB | Refills: 3 | Status: SHIPPED | OUTPATIENT
Start: 2021-02-05

## 2021-02-05 RX ORDER — LEVOTHYROXINE SODIUM 0.07 MG/1
75 TABLET ORAL
Qty: 90 TAB | Refills: 3 | Status: SHIPPED | OUTPATIENT
Start: 2021-02-05 | End: 2021-09-07 | Stop reason: SDUPTHER

## 2021-02-05 NOTE — PATIENT COMMUNICATION
Received request via: Patient    Was the patient seen in the last year in this department? Yes  LOV: 01/2021    Does the patient have an active prescription (recently filled or refills available) for medication(s) requested? No

## 2021-02-05 NOTE — TELEPHONE ENCOUNTER
From: Jason Nation  To: Physician Keron Seaman  Sent: 2/5/2021 3:49 PM PST  Subject: Prescription Question    I have yet to get a return call for a new patient appointment with Dr. Evans, endocronology as Dr. Grimaldo is no longer working there. I have been trying since early December and left several messages. I want to change my pharmacy for my Liothyronine 5 mcg 2x's daily and Levothyroxin 75 mcg 1x daily to [facility] delivery as the price at [facility] has gone up considerably. They need to contact a prescribing DrYash for these refills. Can they contact Dr. Seaman for a new prescription while I try to find an Endocronologist? The prescription is for a 90 day supply of each and a few refills would be nice. LOL    Thank you,   Jason Nation

## 2021-02-16 ENCOUNTER — OFFICE VISIT (OUTPATIENT)
Dept: MEDICAL GROUP | Facility: LAB | Age: 71
End: 2021-02-16
Payer: MEDICARE

## 2021-02-16 VITALS
RESPIRATION RATE: 12 BRPM | BODY MASS INDEX: 25.46 KG/M2 | OXYGEN SATURATION: 96 % | TEMPERATURE: 98.7 F | WEIGHT: 168 LBS | HEART RATE: 78 BPM | SYSTOLIC BLOOD PRESSURE: 116 MMHG | HEIGHT: 68 IN | DIASTOLIC BLOOD PRESSURE: 62 MMHG

## 2021-02-16 DIAGNOSIS — G25.81 RESTLESS LEGS: ICD-10-CM

## 2021-02-16 DIAGNOSIS — H60.501 ACUTE NON-INFECTIVE OTITIS EXTERNA OF RIGHT EAR, UNSPECIFIED TYPE: ICD-10-CM

## 2021-02-16 PROCEDURE — 99213 OFFICE O/P EST LOW 20 MIN: CPT | Performed by: FAMILY MEDICINE

## 2021-02-16 RX ORDER — DEXAMETHASONE SODIUM PHOSPHATE 1 MG/ML
SOLUTION/ DROPS OPHTHALMIC
Qty: 5 ML | Refills: 0 | Status: SHIPPED | OUTPATIENT
Start: 2021-02-16 | End: 2021-09-03

## 2021-02-16 ASSESSMENT — ENCOUNTER SYMPTOMS
FEVER: 0
WHEEZING: 0
CHILLS: 0
SHORTNESS OF BREATH: 0
COUGH: 0

## 2021-02-16 ASSESSMENT — FIBROSIS 4 INDEX: FIB4 SCORE: 1.25

## 2021-02-16 NOTE — PROGRESS NOTES
"Subjective:     CC: Right ear    HPI:   Jason presents today with:    Right ear  Itchy and sore x 1 week. Chronic nasal congestion due to allergies - uses benadryl. Mild left sinus pressure.  No fever/chills.  No sore throat.    Restless legs  Not see benefit with gabapentin, she is continue to have a burning feeling every week or so where she has to move her legs at night.  She does take Benadryl some nights and thinks this could be a trigger for this.    Health Maintenance: Completed    Medications, past medical history, allergies, and social history have been reviewed and updated.    ROS:  Review of Systems   Constitutional: Negative for chills and fever.   Respiratory: Negative for cough, shortness of breath and wheezing.         Objective:       Exam:  /62 (BP Location: Left arm, Patient Position: Sitting, BP Cuff Size: Adult)   Pulse 78   Temp 37.1 °C (98.7 °F)   Resp 12   Ht 1.715 m (5' 7.5\")   Wt 76.2 kg (168 lb)   SpO2 96%   BMI 25.92 kg/m²  Body mass index is 25.92 kg/m².    Constitutional: Alert. Well appearing. No distress.  Skin: Warm, dry, good turgor, no visible rashes.  Eye: Equal, round and reactive to light, conjunctiva clear, lids normal.  ENMT: Moist mucous membranes.  Bilateral tympanic membranes are clear.   Respiratory: Normal effort.  Neuro: Moves all four extremities. No facial droop.  Psych: Answers questions appropriately. Normal affect and mood.      Assessment & Plan:     70 y.o. female with the following -     1. Acute non-infective otitis externa of right ear, unspecified type  Mild pain and itching to the right ear canal.  Exam is relatively unremarkable.  Suspect allergic otitis externa and will try dexamethasone drops.  - dexamethasone (DECADRON) 0.1 % Solution; Apply 2 drops to right ear canal twice daily for 5 days.  Dispense: 5 mL; Refill: 0    2. Restless legs  She did not see improvement with gabapentin.  This may be a side effect of occasional nightly Benadryl.  " She will stop this and if symptoms continue we could try pramipexole.  No anemia on CBC.    Please note that this note was created using voice recognition software.

## 2021-08-31 SDOH — ECONOMIC STABILITY: HOUSING INSECURITY
IN THE LAST 12 MONTHS, WAS THERE A TIME WHEN YOU DID NOT HAVE A STEADY PLACE TO SLEEP OR SLEPT IN A SHELTER (INCLUDING NOW)?: PATIENT REFUSED

## 2021-08-31 SDOH — ECONOMIC STABILITY: FOOD INSECURITY: WITHIN THE PAST 12 MONTHS, YOU WORRIED THAT YOUR FOOD WOULD RUN OUT BEFORE YOU GOT MONEY TO BUY MORE.: PATIENT DECLINED

## 2021-08-31 SDOH — ECONOMIC STABILITY: TRANSPORTATION INSECURITY
IN THE PAST 12 MONTHS, HAS THE LACK OF TRANSPORTATION KEPT YOU FROM MEDICAL APPOINTMENTS OR FROM GETTING MEDICATIONS?: PATIENT DECLINED

## 2021-08-31 SDOH — ECONOMIC STABILITY: INCOME INSECURITY: IN THE LAST 12 MONTHS, WAS THERE A TIME WHEN YOU WERE NOT ABLE TO PAY THE MORTGAGE OR RENT ON TIME?: PATIENT REFUSED

## 2021-08-31 SDOH — HEALTH STABILITY: PHYSICAL HEALTH: ON AVERAGE, HOW MANY DAYS PER WEEK DO YOU ENGAGE IN MODERATE TO STRENUOUS EXERCISE (LIKE A BRISK WALK)?: 5 DAYS

## 2021-08-31 SDOH — HEALTH STABILITY: PHYSICAL HEALTH: ON AVERAGE, HOW MANY MINUTES DO YOU ENGAGE IN EXERCISE AT THIS LEVEL?: 30 MIN

## 2021-08-31 SDOH — ECONOMIC STABILITY: TRANSPORTATION INSECURITY
IN THE PAST 12 MONTHS, HAS LACK OF RELIABLE TRANSPORTATION KEPT YOU FROM MEDICAL APPOINTMENTS, MEETINGS, WORK OR FROM GETTING THINGS NEEDED FOR DAILY LIVING?: PATIENT DECLINED

## 2021-08-31 SDOH — ECONOMIC STABILITY: TRANSPORTATION INSECURITY
IN THE PAST 12 MONTHS, HAS LACK OF TRANSPORTATION KEPT YOU FROM MEETINGS, WORK, OR FROM GETTING THINGS NEEDED FOR DAILY LIVING?: PATIENT DECLINED

## 2021-08-31 SDOH — ECONOMIC STABILITY: INCOME INSECURITY: HOW HARD IS IT FOR YOU TO PAY FOR THE VERY BASICS LIKE FOOD, HOUSING, MEDICAL CARE, AND HEATING?: PATIENT DECLINED

## 2021-08-31 SDOH — ECONOMIC STABILITY: HOUSING INSECURITY

## 2021-08-31 SDOH — HEALTH STABILITY: MENTAL HEALTH
STRESS IS WHEN SOMEONE FEELS TENSE, NERVOUS, ANXIOUS, OR CAN'T SLEEP AT NIGHT BECAUSE THEIR MIND IS TROUBLED. HOW STRESSED ARE YOU?: NOT AT ALL

## 2021-08-31 SDOH — ECONOMIC STABILITY: FOOD INSECURITY: WITHIN THE PAST 12 MONTHS, THE FOOD YOU BOUGHT JUST DIDN'T LAST AND YOU DIDN'T HAVE MONEY TO GET MORE.: PATIENT DECLINED

## 2021-08-31 ASSESSMENT — SOCIAL DETERMINANTS OF HEALTH (SDOH)
HOW OFTEN DO YOU ATTEND CHURCH OR RELIGIOUS SERVICES?: PATIENT DECLINED
IN A TYPICAL WEEK, HOW MANY TIMES DO YOU TALK ON THE PHONE WITH FAMILY, FRIENDS, OR NEIGHBORS?: PATIENT DECLINED
HOW OFTEN DO YOU GET TOGETHER WITH FRIENDS OR RELATIVES?: PATIENT DECLINED
HOW OFTEN DO YOU ATTENT MEETINGS OF THE CLUB OR ORGANIZATION YOU BELONG TO?: PATIENT DECLINED
HOW OFTEN DO YOU HAVE A DRINK CONTAINING ALCOHOL: PATIENT DECLINED
HOW OFTEN DO YOU HAVE SIX OR MORE DRINKS ON ONE OCCASION: PATIENT DECLINED
HOW MANY DRINKS CONTAINING ALCOHOL DO YOU HAVE ON A TYPICAL DAY WHEN YOU ARE DRINKING: PATIENT DECLINED
ARE YOU MARRIED, WIDOWED, DIVORCED, SEPARATED, NEVER MARRIED, OR LIVING WITH A PARTNER?: PATIENT DECLINED
HOW OFTEN DO YOU ATTENT MEETINGS OF THE CLUB OR ORGANIZATION YOU BELONG TO?: PATIENT DECLINED
HOW OFTEN DO YOU ATTEND CHURCH OR RELIGIOUS SERVICES?: PATIENT DECLINED
WITHIN THE PAST 12 MONTHS, YOU WORRIED THAT YOUR FOOD WOULD RUN OUT BEFORE YOU GOT THE MONEY TO BUY MORE: PATIENT DECLINED
HOW OFTEN DO YOU GET TOGETHER WITH FRIENDS OR RELATIVES?: PATIENT DECLINED
HOW HARD IS IT FOR YOU TO PAY FOR THE VERY BASICS LIKE FOOD, HOUSING, MEDICAL CARE, AND HEATING?: PATIENT DECLINED
IN A TYPICAL WEEK, HOW MANY TIMES DO YOU TALK ON THE PHONE WITH FAMILY, FRIENDS, OR NEIGHBORS?: PATIENT DECLINED
ARE YOU MARRIED, WIDOWED, DIVORCED, SEPARATED, NEVER MARRIED, OR LIVING WITH A PARTNER?: PATIENT DECLINED
DO YOU BELONG TO ANY CLUBS OR ORGANIZATIONS SUCH AS CHURCH GROUPS UNIONS, FRATERNAL OR ATHLETIC GROUPS, OR SCHOOL GROUPS?: PATIENT DECLINED
DO YOU BELONG TO ANY CLUBS OR ORGANIZATIONS SUCH AS CHURCH GROUPS UNIONS, FRATERNAL OR ATHLETIC GROUPS, OR SCHOOL GROUPS?: PATIENT DECLINED

## 2021-08-31 ASSESSMENT — LIFESTYLE VARIABLES
HOW OFTEN DO YOU HAVE A DRINK CONTAINING ALCOHOL: PATIENT DECLINED
HOW MANY STANDARD DRINKS CONTAINING ALCOHOL DO YOU HAVE ON A TYPICAL DAY: PATIENT DECLINED
HOW OFTEN DO YOU HAVE SIX OR MORE DRINKS ON ONE OCCASION: PATIENT DECLINED

## 2021-09-03 ENCOUNTER — OFFICE VISIT (OUTPATIENT)
Dept: MEDICAL GROUP | Facility: LAB | Age: 71
End: 2021-09-03
Payer: MEDICARE

## 2021-09-03 ENCOUNTER — HOSPITAL ENCOUNTER (OUTPATIENT)
Dept: LAB | Facility: MEDICAL CENTER | Age: 71
End: 2021-09-03
Attending: FAMILY MEDICINE
Payer: MEDICARE

## 2021-09-03 VITALS
DIASTOLIC BLOOD PRESSURE: 64 MMHG | HEART RATE: 72 BPM | TEMPERATURE: 97.9 F | HEIGHT: 68 IN | SYSTOLIC BLOOD PRESSURE: 134 MMHG | WEIGHT: 168 LBS | BODY MASS INDEX: 25.46 KG/M2 | OXYGEN SATURATION: 96 % | RESPIRATION RATE: 12 BRPM

## 2021-09-03 DIAGNOSIS — R73.02 IGT (IMPAIRED GLUCOSE TOLERANCE): ICD-10-CM

## 2021-09-03 DIAGNOSIS — E78.2 MIXED HYPERLIPIDEMIA: ICD-10-CM

## 2021-09-03 DIAGNOSIS — E03.9 ACQUIRED HYPOTHYROIDISM: ICD-10-CM

## 2021-09-03 LAB
T3 SERPL-MCNC: 134 NG/DL (ref 60–181)
T4 FREE SERPL-MCNC: 1.44 NG/DL (ref 0.93–1.7)
TSH SERPL DL<=0.005 MIU/L-ACNC: 0.93 UIU/ML (ref 0.38–5.33)

## 2021-09-03 PROCEDURE — 36415 COLL VENOUS BLD VENIPUNCTURE: CPT | Mod: GA

## 2021-09-03 PROCEDURE — 84439 ASSAY OF FREE THYROXINE: CPT

## 2021-09-03 PROCEDURE — 84443 ASSAY THYROID STIM HORMONE: CPT

## 2021-09-03 PROCEDURE — 99214 OFFICE O/P EST MOD 30 MIN: CPT | Performed by: FAMILY MEDICINE

## 2021-09-03 PROCEDURE — 85025 COMPLETE CBC W/AUTO DIFF WBC: CPT

## 2021-09-03 PROCEDURE — 83036 HEMOGLOBIN GLYCOSYLATED A1C: CPT | Mod: GA

## 2021-09-03 PROCEDURE — 84480 ASSAY TRIIODOTHYRONINE (T3): CPT

## 2021-09-03 ASSESSMENT — FIBROSIS 4 INDEX: FIB4 SCORE: 1.26

## 2021-09-03 NOTE — PROGRESS NOTES
"Subjective:     CC: Concern for thyroid    HPI:   Jason has a history of acquired hypothyroidism secondary to Hashimoto's disease presents today concerned for low thyroid.  She recently switched pharmacy and has had different manufacturers for her Synthroid and Cytomel.  She is noting significant fatigue and hair loss since switching.  No cold intolerance, no constipation.  Had been stable on current doses for some time.    Health Maintenance: Completed    Medications, past medical history, allergies, and social history have been reviewed and updated.    ROS:  See HPI      Objective:       Exam:  /64 (BP Location: Left arm, Patient Position: Sitting, BP Cuff Size: Adult)   Pulse 72   Temp 36.6 °C (97.9 °F)   Resp 12   Ht 1.715 m (5' 7.5\")   Wt 76.2 kg (168 lb)   SpO2 96%   BMI 25.92 kg/m²  Body mass index is 25.92 kg/m².    Constitutional: Alert. Well appearing. No distress.  Skin: Warm, dry, good turgor, no visible rashes.  Eye: Equal, round and reactive to light, conjunctiva clear, lids normal.  Neck: No thyromegaly, no thyroid nodules.  Respiratory: Normal effort. Lungs are clear to auscultation bilaterally.  Cardiovascular: Regular rate and rhythm. Normal S1/S2. No murmurs, rubs or gallops.   Neuro: Moves all four extremities. No facial droop.  Psych: Answers questions appropriately. Normal affect and mood.    Assessment & Plan:     71 y.o. female with the following -     1. Acquired hypothyroidism  She has noted fatigue and hair loss since switching thyroid medication manufacturers.  Labs as below.  Did discuss that if symptoms continue despite normal labs we could consider minor adjustment in dosage.  - TSH; Future  - FREE THYROXINE; Future  - TRIIDOTHYRONINE; Future  - CBC WITH DIFFERENTIAL; Future  - Comp Metabolic Panel; Future    2. IGT (impaired glucose tolerance)  Due for A1c recheck.  - HEMOGLOBIN A1C; Future    3. Mixed hyperlipidemia  Due for recheck of lipids.  - Lipid Profile; " Future         Please note that this note was created using voice recognition software.

## 2021-09-04 LAB
BASOPHILS # BLD AUTO: 0.4 % (ref 0–1.8)
BASOPHILS # BLD: 0.02 K/UL (ref 0–0.12)
EOSINOPHIL # BLD AUTO: 0.06 K/UL (ref 0–0.51)
EOSINOPHIL NFR BLD: 1.1 % (ref 0–6.9)
ERYTHROCYTE [DISTWIDTH] IN BLOOD BY AUTOMATED COUNT: 44.4 FL (ref 35.9–50)
EST. AVERAGE GLUCOSE BLD GHB EST-MCNC: 111 MG/DL
HBA1C MFR BLD: 5.5 % (ref 4–5.6)
HCT VFR BLD AUTO: 45.4 % (ref 37–47)
HGB BLD-MCNC: 14.8 G/DL (ref 12–16)
IMM GRANULOCYTES # BLD AUTO: 0.02 K/UL (ref 0–0.11)
IMM GRANULOCYTES NFR BLD AUTO: 0.4 % (ref 0–0.9)
LYMPHOCYTES # BLD AUTO: 1.3 K/UL (ref 1–4.8)
LYMPHOCYTES NFR BLD: 23.9 % (ref 22–41)
MCH RBC QN AUTO: 29.8 PG (ref 27–33)
MCHC RBC AUTO-ENTMCNC: 32.6 G/DL (ref 33.6–35)
MCV RBC AUTO: 91.5 FL (ref 81.4–97.8)
MONOCYTES # BLD AUTO: 0.37 K/UL (ref 0–0.85)
MONOCYTES NFR BLD AUTO: 6.8 % (ref 0–13.4)
NEUTROPHILS # BLD AUTO: 3.66 K/UL (ref 2–7.15)
NEUTROPHILS NFR BLD: 67.4 % (ref 44–72)
NRBC # BLD AUTO: 0 K/UL
NRBC BLD-RTO: 0 /100 WBC
PLATELET # BLD AUTO: 215 K/UL (ref 164–446)
PMV BLD AUTO: 12.3 FL (ref 9–12.9)
RBC # BLD AUTO: 4.96 M/UL (ref 4.2–5.4)
WBC # BLD AUTO: 5.4 K/UL (ref 4.8–10.8)

## 2021-09-07 ENCOUNTER — HOSPITAL ENCOUNTER (OUTPATIENT)
Dept: LAB | Facility: MEDICAL CENTER | Age: 71
End: 2021-09-07
Attending: FAMILY MEDICINE
Payer: MEDICARE

## 2021-09-07 DIAGNOSIS — E03.9 ACQUIRED HYPOTHYROIDISM: ICD-10-CM

## 2021-09-07 DIAGNOSIS — E78.2 MIXED HYPERLIPIDEMIA: ICD-10-CM

## 2021-09-07 LAB
ALBUMIN SERPL BCP-MCNC: 3.8 G/DL (ref 3.2–4.9)
ALBUMIN/GLOB SERPL: 1.4 G/DL
ALP SERPL-CCNC: 72 U/L (ref 30–99)
ALT SERPL-CCNC: 18 U/L (ref 2–50)
ANION GAP SERPL CALC-SCNC: 10 MMOL/L (ref 7–16)
AST SERPL-CCNC: 23 U/L (ref 12–45)
BILIRUB SERPL-MCNC: 0.7 MG/DL (ref 0.1–1.5)
BUN SERPL-MCNC: 12 MG/DL (ref 8–22)
CALCIUM SERPL-MCNC: 9 MG/DL (ref 8.5–10.5)
CHLORIDE SERPL-SCNC: 105 MMOL/L (ref 96–112)
CHOLEST SERPL-MCNC: 172 MG/DL (ref 100–199)
CO2 SERPL-SCNC: 24 MMOL/L (ref 20–33)
CREAT SERPL-MCNC: 0.68 MG/DL (ref 0.5–1.4)
GLOBULIN SER CALC-MCNC: 2.8 G/DL (ref 1.9–3.5)
GLUCOSE SERPL-MCNC: 84 MG/DL (ref 65–99)
HDLC SERPL-MCNC: 34 MG/DL
LDLC SERPL CALC-MCNC: 113 MG/DL
POTASSIUM SERPL-SCNC: 3.9 MMOL/L (ref 3.6–5.5)
PROT SERPL-MCNC: 6.6 G/DL (ref 6–8.2)
SODIUM SERPL-SCNC: 139 MMOL/L (ref 135–145)
TRIGL SERPL-MCNC: 124 MG/DL (ref 0–149)

## 2021-09-07 PROCEDURE — 80061 LIPID PANEL: CPT

## 2021-09-07 PROCEDURE — 80053 COMPREHEN METABOLIC PANEL: CPT

## 2021-09-07 PROCEDURE — 36415 COLL VENOUS BLD VENIPUNCTURE: CPT

## 2021-09-07 RX ORDER — LEVOTHYROXINE SODIUM 0.07 MG/1
75 TABLET ORAL
Qty: 90 TABLET | Refills: 3 | Status: SHIPPED | OUTPATIENT
Start: 2021-09-07 | End: 2022-03-16

## 2022-03-09 ENCOUNTER — OFFICE VISIT (OUTPATIENT)
Dept: MEDICAL GROUP | Facility: LAB | Age: 72
End: 2022-03-09
Payer: MEDICARE

## 2022-03-09 VITALS
HEART RATE: 64 BPM | HEIGHT: 68 IN | SYSTOLIC BLOOD PRESSURE: 128 MMHG | DIASTOLIC BLOOD PRESSURE: 76 MMHG | TEMPERATURE: 97.1 F | BODY MASS INDEX: 25.91 KG/M2 | WEIGHT: 171 LBS | OXYGEN SATURATION: 96 % | RESPIRATION RATE: 14 BRPM

## 2022-03-09 DIAGNOSIS — Z78.0 POST-MENOPAUSAL: ICD-10-CM

## 2022-03-09 DIAGNOSIS — G89.29 CHRONIC NONINTRACTABLE HEADACHE, UNSPECIFIED HEADACHE TYPE: ICD-10-CM

## 2022-03-09 DIAGNOSIS — U09.9 POST-COVID CHRONIC FATIGUE: ICD-10-CM

## 2022-03-09 DIAGNOSIS — G93.32 POST-COVID CHRONIC FATIGUE: ICD-10-CM

## 2022-03-09 DIAGNOSIS — R51.9 CHRONIC NONINTRACTABLE HEADACHE, UNSPECIFIED HEADACHE TYPE: ICD-10-CM

## 2022-03-09 PROCEDURE — 99214 OFFICE O/P EST MOD 30 MIN: CPT | Performed by: FAMILY MEDICINE

## 2022-03-09 RX ORDER — AMITRIPTYLINE HYDROCHLORIDE 10 MG/1
10 TABLET, FILM COATED ORAL NIGHTLY
Qty: 30 TABLET | Refills: 1 | Status: SHIPPED | OUTPATIENT
Start: 2022-03-09 | End: 2022-04-08

## 2022-03-09 ASSESSMENT — FIBROSIS 4 INDEX: FIB4 SCORE: 1.84

## 2022-03-09 NOTE — PROGRESS NOTES
"Subjective:     CC: Headaches, post covid    HPI:   Jason presents today with concerns regarding headaches around the left eye and post Covid symptoms.    Left eye pain is described as pinch/pressure, up to 8/10.  Seems to be right behind around the left eye but also goes towards the left ear.  She did see ophthalmology with no abnormalities noted. Clear nasal congestion does worsen with it  Intermittent, significantly worse over last 3 weeks but off and on for years.  Lasts for up to a few days. No obvious triggers. No left sided tearing. No vision changes.    She has also noted some persistent fatigue and hair loss after COVID.  She would like labs checked.    Medications, past medical history, allergies, and social history have been reviewed and updated.      Objective:       Exam:  /76 (BP Location: Left arm, Patient Position: Sitting, BP Cuff Size: Adult)   Pulse 64   Temp 36.2 °C (97.1 °F)   Resp 14   Ht 1.715 m (5' 7.5\")   Wt 77.6 kg (171 lb)   SpO2 96%   BMI 26.39 kg/m²  Body mass index is 26.39 kg/m².    Constitutional: Alert. Well appearing. No distress.  Skin: Warm, dry, good turgor, no visible rashes.  Eye: Equal, round and reactive to light, conjunctiva clear, lids normal.  Extraocular movements are intact.  ENMT: Moist mucous membranes.  No sinus pressure/tenderness.  Tympanic membranes are clear.  Respiratory: Normal effort. Lungs are clear to auscultation bilaterally.  Cardiovascular: Regular rate and rhythm. Normal S1/S2. No murmurs, rubs or gallops.   Neuro: Cranial nerves II through XII are intact to exam.  Psych: Answers questions appropriately. Normal affect and mood.    Assessment & Plan:     71 y.o. female with the following -     1. Chronic nonintractable headache, unspecified headache type  Left periorbital headaches that have features of migraine, cluster, and tension.  These have been present for years but have significantly worsened over the last 3 weeks.  Given her age and " significant, acute progression of symptoms, will order MRI to rule out underlying pathology.  We discussed multiple treatments both as therapeutic and possibly diagnostic measure.  Consider verapamil to treat possible cluster headache but she prefer to try amitriptyline due to help with sleep.  She will start this nightly with close follow-up after imaging.  - amitriptyline (ELAVIL) 10 MG Tab; Take 1 Tablet by mouth every evening for 30 days.  Dispense: 30 Tablet; Refill: 1  - MR-BRAIN-W/O; Future  - CBC WITH DIFFERENTIAL; Future  - Comp Metabolic Panel; Future  - TSH WITH REFLEX TO FT4; Future    2. Post-COVID chronic fatigue  Continued fatigue and hair loss.  Checking labs as above.    3. Post-menopausal  - DS-BONE DENSITY STUDY (DEXA); Future      Please note that this note was created using voice recognition software.

## 2022-03-09 NOTE — PROGRESS NOTES
"Subjective:     CC: ***    HPI:   Jason presents today with ***      Medications, past medical history, allergies, and social history have been reviewed and updated.      Objective:       Exam:  /76 (BP Location: Left arm, Patient Position: Sitting, BP Cuff Size: Adult)   Pulse 64   Temp 36.2 °C (97.1 °F)   Resp 14   Ht 1.715 m (5' 7.5\")   Wt 77.6 kg (171 lb)   SpO2 96%   BMI 26.39 kg/m²  Body mass index is 26.39 kg/m².    Constitutional: Alert. Well appearing. No distress.  Skin: Warm, dry, good turgor, no visible rashes.  Eye: Equal, round and reactive to light, conjunctiva clear, lids normal.  ENMT: Moist mucous membranes. Normal dentition.  Respiratory: Normal effort. Lungs are clear to auscultation bilaterally.  Cardiovascular: Regular rate and rhythm. Normal S1/S2. No murmurs, rubs or gallops.   Neuro: Moves all four extremities. No facial droop.  Psych: Answers questions appropriately. Normal affect and mood.    Labs: ***    Assessment & Plan:     71 y.o. female with the following -     There are no diagnoses linked to this encounter.     Please note that this note was created using voice recognition software.      " Reason for follow up: Atrial fibrillation        Impression:   Â Hx of Persistent, drug-refractory (Rhythmol) atrial fibrillation, S/P RF atrial fibrillation ablation on 8/1/2016, S/P Mitral valve repair 12/13/16, developed junctional rhythm and then atrial fibrillation postoperatively, S/P unsuccessful DCCV 12/17/16 Jack Ramos) secondary to postop sinus node dysfunction & MV repair, found to be in SR 1/4/17.    Â· Patient had some improvement of symptoms post ablation, but continued feeling some shortness of breathÂ   Â· Diagnosed September 2012  Â· S/P unsuccessful DCCV 12/17/16   Â· DAVID/DCCV 12/5/12, 6/15/16 and 8/12/16: successful, developed flash pulmonary edema secondary to cardioversion on 6/15/16  Â· Symptomatic with chest pain and dyspnea on exertion Â   Â· Previously on amiodarone, discontinued 11/9/16 secondary to maintenance of NSR, resumed   Â· DAVID 11/22/16: EF 55%, grade II/IV diastolic dysfunction  Â· Status post AF ablation 8/1/16  Â· Limited echo 6/16/16: EF 55%, probably mild to moderate MR (eccentric, consider DAVID if clinically indicated), moderate TR, RVSP 60.5 mmHg, ANGEL LUIS 64.1 ml/m2, LA length 7.3 x 6.7  Â· Stress testÂ 5/19/16: likely normal Cardiolite per Dr. Marilyn Restrepo  Â· Holter monitor 48 hour 11/2015: SB, SR infrequent PACs/PVCs, no AF Â   Â· Previously on Rythmol (ineffective) and Multaq (discontinued due to elevated LFTs) Â   High risk medication use on Sotalol 60 mg in AM and 120 mg in PM  Â· Initiated on 12/15/16  Â· ECG 5/2/17: Likely atypical atrial flutter,  ms  Â· K+ 4.6, Cr 0.97, GFR 82 (4/20/17) Mag 2.1 (12/17/16)   Â· Dosing decreased 12/27/16 due to borderline JT (360 ms) and concern for bradycardia   Anticoagulation on Warfarin   Â· Previously on Xarelto, discontinued during admission due to valvular disease  Â· F/B PCP   CHADVASc 2 (HTN,CAD)  Mild Coronary Artery Disease   Â· Cardiac Cath 12/7/16: LAD 20% stenosis, left circumflex 30% stenosis, third marginal 35% stenosis, RCA 50% stenosis  Â· F/B  Savage  Severe Mitral Valve Regurgitation, S/P Mitral Valve Repair on 12/13/16  Â· Review of previous echos show that degree of MR was likely underestimated by official reads   Right bundle branch block Â   Obstructive sleep apnea, on CPAP      Recommendations:   Â· Due to reoccurrence of atrial flutter, will discontinue Sotalol. Given symptomatic (palpitations) atrial flutter recommend a cardioversion as soon as possible in attempt to convert to normal sinus rhythm and relief of symptoms. DAVID and DCCV  was discussed in detail. The procedure, benefits, alternatives, and risks including but not limited to infection, bleeding,skin irritation, need for repeat shock, non-shockable rhtyhm, pneumothorax,esophageal perforation, pericarditis, embolic events, kidney injury, emergent need for surgery,  myocardial infarction, stroke and death were explained to the patient. The patient's questions were answered. The patient voiced understanding and wishes to proceed. Will consider Amiodarone therapy following cardioversion for optimization of maintenance of sinus rhythm depending on heart rate ( hx of bradycardia)  As patient is on Warfarin will recommend a DAVID. If INR sub therapeutic. HPI:This is a 57year old male seen in clinic today for history of persistent, drug-refractory atrial fibrillation. He is status post atrial fibrillation ablation on 8/1/16. He underwent a mitral valve repair on 12/13/16, developed junctional rhythm and then atrial fibrillation postoperatively. He was on Amiodarone for prophylaxis prior to mitral valve repair. Medication was ultimately discontinued secondary to concerns of bradycardia. He was then placed on Sotalol therapy and underwent a cardioversion on 12/17/16 which was unsuccessful. He was found to be in sinus rhythm on 1/4/17. He remains on Sotalol therapy for rhythm control and is anticoagulated on Warfarin which is followed by primary care provider. Denies any bleeding concerns.  He reports symptoms of palpitation in office and states that it began about a week ago. He reports heart rates ranging from  bpm which began about a week ago. Also he states yesterday he was on the treadmill for 25 minutes and began to feel dizzy and stop. He question continuation of exercise if any restrictions at this time advised patent to continue with exercise and listen to his body. In office today ECG reviewed and he is currently found to be in atrial flutter. Pathophysiology of atrial flutter discussed in detail as well as treatment options. Discussed that atrial fibrillation has no curative treatment and the success of treatment is decreased due to advance progression of disease. Discussed recommendation to discontinue Sotalol. Discussed treatment options including a cardioversion as soon as possible in attempt to convert to sinus rhythm for symptom relief. Procedure discussed in detail including risks and benefits and alternatives. Advised patient following the cardioversion may consider initiating short term Amiodarone to optimize success of maintain sinus rhythm depending upon heart rate (hx bradycardia) Questions were invited and answered. He verbalized understanding and wishes to proceed with treatment plan. He is accompanied in office by his wife today.           PAST MEDICAL HX:    Adenomatous polyp of colon                                    Persistent atrial fibrillation                                  Comment: 11/2012    Hepatocellular dysfunction                                    Former cigar smoker                                             Comment: quit 2007    HTN (hypertension)                                            Right bundle branch block                                     Aortic valve sclerosis                                        Mitral valve prolapse                                           Comment: moderate insufficiency, noted 12/2012    ED (erectile dysfunction) HDL deficiency                                                Migraine headache                                             Glaucoma (increased eye pressure)                             Cataract                                                      EVE on CPAP                                                   EVE (obstructive sleep apnea)                   12/2/2014     H/O mitral valve repair                         2016            Allergies and Medications were reviewed        ROS: Above review of system completed by nursing staff and reviewed by provider. Pertinent items are noted in the history of present illness (HPI). PHYSICAL EXAM:  Visit Vitals   â¢ BP (!) 136/96 (BP Location: [facility], Patient Position: Sitting, Cuff Size: Regular)   â¢ Pulse 59   â¢ Resp 16   â¢ Ht 6' (1.829 m)   â¢ Wt 95.3 kg   â¢ SpO2 95%   â¢ BMI 28.48 kg/m2     GENERAL:  Cooperative, sitting comfortably, in no acute distress. HEAD: Normocephalic, Non-traumatic  NECK: No lymphadenopathy  CARDIOVASCULAR:   Regular rate and rhythm, normal S1/S2, no murmur. RESPIRATORY:  Clear to ausculation bilaterally. No wheezes, rale or rhonchi. GI: Non-distended, Non-tender, Bowel sounds heard  EXTREMITIES: No clubbing or cyanosis. No pitting edema. NEURO: No obvious motor or sensor deficits  PSYCHIATRIC:   Alert and oriented to person, place, and time. INTEGUMENTARY:  Warm, no rashes or nodules. I have personally reviewed and interpreted EKG below. I have reviewed and summarized old records as per the impression section.         EKG: Likely atypical atrial flutter, Vent rate 103 bpm, QRS, 148 ms, QT/QTc 420/550 ms         Labs:   Lab Results   Component Value Date    WBC 11.2 (H) 12/28/2016    WBC 14.5 (H) 12/17/2016    HGB 10.3 (L) 12/28/2016    HGB 11.0 (L) 12/17/2016    HCT 33.7 (L) 12/28/2016    HCT 34.4 (L) 12/17/2016     12/28/2016     12/17/2016     12/05/2012     12/15/2011    INR 2.0 04/19/2017    INR 2.2 04/04/2017    INR 2.6 12/28/2016    INR 5.2 (HH) 12/21/2016    POTASSIUM 4.6 04/20/2017    POTASSIUM 4.9 12/28/2016    BUN 26 (H) 04/20/2017    BUN 21 (H) 12/28/2016    CREATININE 0.97 04/20/2017    CREATININE 1.11 12/28/2016     (H) 12/17/2016     (H) 12/16/2016    TSH 0.619 05/04/2016         On 05/02/17, I, Howard Oh, scribed the services personally performed by Dr. Nancy Polo. During this visit I obtained the chief complaint, history of present illness, performed the exam and developed the impression and plan. I confirmed with patient the review of system, past family and social history that was originally obtained by the nurse/scribe. All of this has been recorded here as a scribed note by the nurse/scribe who performed the duties of a scribe for this encounter in my presence.

## 2022-03-15 DIAGNOSIS — E03.9 ACQUIRED HYPOTHYROIDISM: ICD-10-CM

## 2022-03-16 RX ORDER — LEVOTHYROXINE SODIUM 0.07 MG/1
TABLET ORAL
Qty: 90 TABLET | Refills: 3 | Status: SHIPPED | OUTPATIENT
Start: 2022-03-16 | End: 2023-03-08

## 2022-03-16 NOTE — TELEPHONE ENCOUNTER
Received request via: Pharmacy    Was the patient seen in the last year in this department? Yes  3/9/2022  Does the patient have an active prescription (recently filled or refills available) for medication(s) requested? No

## 2022-03-18 ENCOUNTER — HOSPITAL ENCOUNTER (OUTPATIENT)
Dept: LAB | Facility: MEDICAL CENTER | Age: 72
End: 2022-03-18
Attending: FAMILY MEDICINE
Payer: MEDICARE

## 2022-03-18 DIAGNOSIS — R51.9 CHRONIC NONINTRACTABLE HEADACHE, UNSPECIFIED HEADACHE TYPE: ICD-10-CM

## 2022-03-18 DIAGNOSIS — G89.29 CHRONIC NONINTRACTABLE HEADACHE, UNSPECIFIED HEADACHE TYPE: ICD-10-CM

## 2022-03-18 LAB
ALBUMIN SERPL BCP-MCNC: 3.9 G/DL (ref 3.2–4.9)
ALBUMIN/GLOB SERPL: 1.6 G/DL
ALP SERPL-CCNC: 79 U/L (ref 30–99)
ALT SERPL-CCNC: 20 U/L (ref 2–50)
ANION GAP SERPL CALC-SCNC: 11 MMOL/L (ref 7–16)
AST SERPL-CCNC: 20 U/L (ref 12–45)
BASOPHILS # BLD AUTO: 0.8 % (ref 0–1.8)
BASOPHILS # BLD: 0.04 K/UL (ref 0–0.12)
BILIRUB SERPL-MCNC: 0.5 MG/DL (ref 0.1–1.5)
BUN SERPL-MCNC: 17 MG/DL (ref 8–22)
CALCIUM SERPL-MCNC: 8.9 MG/DL (ref 8.5–10.5)
CHLORIDE SERPL-SCNC: 103 MMOL/L (ref 96–112)
CO2 SERPL-SCNC: 24 MMOL/L (ref 20–33)
CREAT SERPL-MCNC: 0.71 MG/DL (ref 0.5–1.4)
EOSINOPHIL # BLD AUTO: 0.1 K/UL (ref 0–0.51)
EOSINOPHIL NFR BLD: 2.1 % (ref 0–6.9)
ERYTHROCYTE [DISTWIDTH] IN BLOOD BY AUTOMATED COUNT: 43.5 FL (ref 35.9–50)
GFR SERPLBLD CREATININE-BSD FMLA CKD-EPI: 89 ML/MIN/1.73 M 2
GLOBULIN SER CALC-MCNC: 2.5 G/DL (ref 1.9–3.5)
GLUCOSE SERPL-MCNC: 87 MG/DL (ref 65–99)
HCT VFR BLD AUTO: 42.7 % (ref 37–47)
HGB BLD-MCNC: 14.2 G/DL (ref 12–16)
IMM GRANULOCYTES # BLD AUTO: 0.01 K/UL (ref 0–0.11)
IMM GRANULOCYTES NFR BLD AUTO: 0.2 % (ref 0–0.9)
LYMPHOCYTES # BLD AUTO: 1.78 K/UL (ref 1–4.8)
LYMPHOCYTES NFR BLD: 37.6 % (ref 22–41)
MCH RBC QN AUTO: 29.9 PG (ref 27–33)
MCHC RBC AUTO-ENTMCNC: 33.3 G/DL (ref 33.6–35)
MCV RBC AUTO: 89.9 FL (ref 81.4–97.8)
MONOCYTES # BLD AUTO: 0.37 K/UL (ref 0–0.85)
MONOCYTES NFR BLD AUTO: 7.8 % (ref 0–13.4)
NEUTROPHILS # BLD AUTO: 2.44 K/UL (ref 2–7.15)
NEUTROPHILS NFR BLD: 51.5 % (ref 44–72)
NRBC # BLD AUTO: 0 K/UL
NRBC BLD-RTO: 0 /100 WBC
PLATELET # BLD AUTO: 205 K/UL (ref 164–446)
PMV BLD AUTO: 11.5 FL (ref 9–12.9)
POTASSIUM SERPL-SCNC: 4.1 MMOL/L (ref 3.6–5.5)
PROT SERPL-MCNC: 6.4 G/DL (ref 6–8.2)
RBC # BLD AUTO: 4.75 M/UL (ref 4.2–5.4)
SODIUM SERPL-SCNC: 138 MMOL/L (ref 135–145)
TSH SERPL DL<=0.005 MIU/L-ACNC: 1.73 UIU/ML (ref 0.38–5.33)
WBC # BLD AUTO: 4.7 K/UL (ref 4.8–10.8)

## 2022-03-18 PROCEDURE — 84443 ASSAY THYROID STIM HORMONE: CPT | Mod: GA

## 2022-03-18 PROCEDURE — 80053 COMPREHEN METABOLIC PANEL: CPT

## 2022-03-18 PROCEDURE — 36415 COLL VENOUS BLD VENIPUNCTURE: CPT | Mod: GA

## 2022-03-18 PROCEDURE — 85025 COMPLETE CBC W/AUTO DIFF WBC: CPT

## 2022-04-13 ENCOUNTER — OFFICE VISIT (OUTPATIENT)
Dept: MEDICAL GROUP | Facility: LAB | Age: 72
End: 2022-04-13
Payer: MEDICARE

## 2022-04-13 VITALS
SYSTOLIC BLOOD PRESSURE: 124 MMHG | BODY MASS INDEX: 25.46 KG/M2 | RESPIRATION RATE: 14 BRPM | OXYGEN SATURATION: 99 % | HEIGHT: 68 IN | DIASTOLIC BLOOD PRESSURE: 70 MMHG | TEMPERATURE: 97 F | WEIGHT: 168 LBS | HEART RATE: 88 BPM

## 2022-04-13 DIAGNOSIS — G89.29 CHRONIC NONINTRACTABLE HEADACHE, UNSPECIFIED HEADACHE TYPE: ICD-10-CM

## 2022-04-13 DIAGNOSIS — R51.9 CHRONIC NONINTRACTABLE HEADACHE, UNSPECIFIED HEADACHE TYPE: ICD-10-CM

## 2022-04-13 PROCEDURE — 99214 OFFICE O/P EST MOD 30 MIN: CPT | Performed by: FAMILY MEDICINE

## 2022-04-13 RX ORDER — AMOXICILLIN AND CLAVULANATE POTASSIUM 875; 125 MG/1; MG/1
1 TABLET, FILM COATED ORAL 2 TIMES DAILY
Qty: 10 TABLET | Refills: 0 | Status: SHIPPED
Start: 2022-04-13 | End: 2022-04-18

## 2022-04-13 ASSESSMENT — FIBROSIS 4 INDEX: FIB4 SCORE: 1.61

## 2022-04-13 NOTE — PROGRESS NOTES
"Subjective:     CC: Follow up headache    HPI:   Jason presents today to follow-up on headaches.  Initially seen for chronic left periorbital headaches last month.  It significantly worsened over about the past 3 weeks.  MRI was negative for intracranial pathology but did show mucosal thickening at the left maxillary sinus.  He does continue to have some intermittent nasal congestion.  Pain is intermittent but for last about days at a time when it happens.  No fevers.  She did think she saw some improvement with amitriptyline but this made her feel drugged.    Medications, past medical history, allergies, and social history have been reviewed and updated.      Objective:       Exam:  /70 (BP Location: Right arm, Patient Position: Sitting, BP Cuff Size: Adult)   Pulse 88   Temp 36.1 °C (97 °F)   Resp 14   Ht 1.715 m (5' 7.5\")   Wt 76.2 kg (168 lb)   SpO2 99%   BMI 25.92 kg/m²  Body mass index is 25.92 kg/m².    Constitutional: Alert. Well appearing. No distress.  Skin: Warm, dry, good turgor, no visible rashes.  Eye: Equal, round and reactive to light, conjunctiva clear, lids normal.  ENMT: Moist mucous membranes.  Bilateral tympanic membranes are clear.  There is tenderness over the left frontal and maxillary sinuses.  Respiratory: Normal effort. Lungs are clear to auscultation bilaterally.  Cardiovascular: Regular rate and rhythm. Normal S1/S2. No murmurs, rubs or gallops.   Neuro: Moves all four extremities. No facial droop.  Psych: Answers questions appropriately. Normal affect and mood.      Assessment & Plan:     72 y.o. female with the following -     1. Chronic nonintractable headache, unspecified headache type  Left periorbital headaches present for years but initially worsened about 2 months ago.  MRI clear besides left maxillary sinus thickening and she does continue to have some nasal congestion as well as sinus tenderness on exam.  Improved with amitriptyline but did not tolerate side " effects.  I think it is possible that these may be secondary to chronic sinusitis or sinus congestion.  She will begin by trialing nondrowsy antihistamine and Flonase.  If no improvement she will complete a course of Augmentin follow-up if pain continues.  - amoxicillin-clavulanate (AUGMENTIN) 875-125 MG Tab; Take 1 Tablet by mouth 2 times a day for 5 days.  Dispense: 10 Tablet; Refill: 0      Please note that this note was created using voice recognition software.

## 2022-04-13 NOTE — PROGRESS NOTES
"Subjective:     CC: ***    HPI:   Jason presents today with ***      Medications, past medical history, allergies, and social history have been reviewed and updated.      Objective:       Exam:  /70 (BP Location: Right arm, Patient Position: Sitting, BP Cuff Size: Adult)   Pulse 88   Temp 36.1 °C (97 °F)   Resp 14   Ht 1.715 m (5' 7.5\")   Wt 76.2 kg (168 lb)   SpO2 99%   BMI 25.92 kg/m²  Body mass index is 25.92 kg/m².    Constitutional: Alert. Well appearing. No distress.  Skin: Warm, dry, good turgor, no visible rashes.  Eye: Equal, round and reactive to light, conjunctiva clear, lids normal.  ENMT: Moist mucous membranes. Normal dentition.  Respiratory: Normal effort. Lungs are clear to auscultation bilaterally.  Cardiovascular: Regular rate and rhythm. Normal S1/S2. No murmurs, rubs or gallops.   Neuro: Moves all four extremities. No facial droop.  Psych: Answers questions appropriately. Normal affect and mood.    Labs: ***    Assessment & Plan:     72 y.o. female with the following -     There are no diagnoses linked to this encounter.     Please note that this note was created using voice recognition software.      "

## 2022-04-22 ENCOUNTER — APPOINTMENT (OUTPATIENT)
Dept: URGENT CARE | Facility: PHYSICIAN GROUP | Age: 72
End: 2022-04-22
Payer: MEDICARE

## 2022-08-11 ENCOUNTER — HOSPITAL ENCOUNTER (OUTPATIENT)
Dept: LAB | Facility: MEDICAL CENTER | Age: 72
End: 2022-08-11
Attending: INTERNAL MEDICINE
Payer: MEDICARE

## 2022-08-11 LAB
25(OH)D3 SERPL-MCNC: 111 NG/ML (ref 30–100)
T3FREE SERPL-MCNC: 4.42 PG/ML (ref 2–4.4)
T4 FREE SERPL-MCNC: 1.48 NG/DL (ref 0.93–1.7)
THYROPEROXIDASE AB SERPL-ACNC: 16 IU/ML (ref 0–9)
TSH SERPL DL<=0.005 MIU/L-ACNC: 0.02 UIU/ML (ref 0.38–5.33)
VIT B12 SERPL-MCNC: 1147 PG/ML (ref 211–911)

## 2022-08-11 PROCEDURE — 82607 VITAMIN B-12: CPT

## 2022-08-11 PROCEDURE — 84443 ASSAY THYROID STIM HORMONE: CPT

## 2022-08-11 PROCEDURE — 82306 VITAMIN D 25 HYDROXY: CPT

## 2022-08-11 PROCEDURE — 84439 ASSAY OF FREE THYROXINE: CPT

## 2022-08-11 PROCEDURE — 36415 COLL VENOUS BLD VENIPUNCTURE: CPT

## 2022-08-11 PROCEDURE — 84481 FREE ASSAY (FT-3): CPT

## 2022-08-11 PROCEDURE — 86376 MICROSOMAL ANTIBODY EACH: CPT

## 2022-11-07 ENCOUNTER — PATIENT MESSAGE (OUTPATIENT)
Dept: HEALTH INFORMATION MANAGEMENT | Facility: OTHER | Age: 72
End: 2022-11-07

## 2022-11-21 ENCOUNTER — HOSPITAL ENCOUNTER (OUTPATIENT)
Dept: LAB | Facility: MEDICAL CENTER | Age: 72
End: 2022-11-21
Attending: INTERNAL MEDICINE
Payer: MEDICARE

## 2022-11-21 LAB
25(OH)D3 SERPL-MCNC: 66 NG/ML (ref 30–100)
T3FREE SERPL-MCNC: 5.11 PG/ML (ref 2–4.4)
T4 FREE SERPL-MCNC: 1.52 NG/DL (ref 0.93–1.7)
TSH SERPL DL<=0.005 MIU/L-ACNC: 0.01 UIU/ML (ref 0.38–5.33)

## 2022-11-21 PROCEDURE — 82306 VITAMIN D 25 HYDROXY: CPT

## 2022-11-21 PROCEDURE — 36415 COLL VENOUS BLD VENIPUNCTURE: CPT

## 2022-11-21 PROCEDURE — 84481 FREE ASSAY (FT-3): CPT

## 2022-11-21 PROCEDURE — 84439 ASSAY OF FREE THYROXINE: CPT

## 2022-11-21 PROCEDURE — 84443 ASSAY THYROID STIM HORMONE: CPT

## 2023-03-08 ENCOUNTER — OFFICE VISIT (OUTPATIENT)
Dept: MEDICAL GROUP | Facility: LAB | Age: 73
End: 2023-03-08
Payer: MEDICARE

## 2023-03-08 VITALS
BODY MASS INDEX: 25.49 KG/M2 | OXYGEN SATURATION: 95 % | HEART RATE: 98 BPM | RESPIRATION RATE: 12 BRPM | WEIGHT: 168.2 LBS | SYSTOLIC BLOOD PRESSURE: 120 MMHG | DIASTOLIC BLOOD PRESSURE: 62 MMHG | HEIGHT: 68 IN | TEMPERATURE: 97.2 F

## 2023-03-08 DIAGNOSIS — R53.83 OTHER FATIGUE: ICD-10-CM

## 2023-03-08 DIAGNOSIS — J45.20 MILD INTERMITTENT ASTHMA WITHOUT COMPLICATION: ICD-10-CM

## 2023-03-08 DIAGNOSIS — E03.9 ACQUIRED HYPOTHYROIDISM: ICD-10-CM

## 2023-03-08 PROCEDURE — 99214 OFFICE O/P EST MOD 30 MIN: CPT | Performed by: FAMILY MEDICINE

## 2023-03-08 RX ORDER — FLUTICASONE PROPIONATE 44 UG/1
2 AEROSOL, METERED RESPIRATORY (INHALATION) 2 TIMES DAILY
Qty: 10.6 G | Refills: 1 | Status: SHIPPED | OUTPATIENT
Start: 2023-03-08 | End: 2023-07-25

## 2023-03-08 RX ORDER — LEVOTHYROXINE SODIUM 88 UG/1
TABLET ORAL
COMMUNITY
Start: 2023-01-05

## 2023-03-08 RX ORDER — ALBUTEROL SULFATE 90 UG/1
2 AEROSOL, METERED RESPIRATORY (INHALATION) EVERY 4 HOURS PRN
Qty: 8 G | Refills: 1 | Status: SHIPPED | OUTPATIENT
Start: 2023-03-08

## 2023-03-08 ASSESSMENT — FIBROSIS 4 INDEX: FIB4 SCORE: 1.61

## 2023-03-08 NOTE — PROGRESS NOTES
"Subjective:     CC: Fatigue    HPI:   Jason presents today to discuss fatigue.    Describes \"crash and burn days\" Where she is exhausted and slightly short of breath. Happens a few days weekly. Started after Covid infection over 1 year ago.  She does report a history of asthma with no specific current medications for this. Vague short of breath/tightness feeling.     Diet overall healthy, some carb cravings.  No regular exercise during winter.  Sleeping \"okay\". Some night time awakings.    No fevers, no cough.  No weight loss, no night sweats.  No nausea or vomiting.  No chest pain.    Medications, past medical history, allergies, and social history have been reviewed and updated.      Objective:       Exam:  /62 (BP Location: Left arm, Patient Position: Sitting, BP Cuff Size: Adult)   Pulse 98   Temp 36.2 °C (97.2 °F)   Resp 12   Ht 1.715 m (5' 7.5\")   Wt 76.3 kg (168 lb 3.2 oz)   SpO2 95%   BMI 25.96 kg/m²  Body mass index is 25.96 kg/m².    Constitutional: Alert. Well appearing. No distress.  Skin: Warm, dry, good turgor, no visible rashes.  Eye: Equal, round and reactive to light, conjunctiva clear, lids normal.  ENMT: Moist mucous membranes. Normal dentition.  Respiratory: Normal effort.  Mild bilateral end expiratory wheezing with forced expiration.  Cardiovascular: Regular rate and rhythm. Normal S1/S2. No murmurs, rubs or gallops.   Neuro: Moves all four extremities. No facial droop.  Psych: Answers questions appropriately. Normal affect and mood.      Assessment & Plan:     72 y.o. female with the following -     1. Other fatigue  Chronic, going on for at least 1 year since COVID infection.  Long COVID certainly a possibility but will work-up for other underlying causes as below.  Continue to work on healthy diet, regular exercise, sleep hygiene.  - CBC WITH DIFFERENTIAL; Future  - Comp Metabolic Panel; Future  - DX-CHEST-2 VIEWS; Future    2. Acquired hypothyroidism  Follows with " endocrinology, will recheck thyroid labs.  - TSH; Future  - FREE THYROXINE; Future    3. Mild intermittent asthma without complication  Chronic mild and vague shortness of breath.  Reports history of mild asthma, no current medications.  Mild end expiratory wheezing on exam.  Start Flovent, albuterol for rescue inhaler.  Check CXR.  Follow-up if not improving.  - DX-CHEST-2 VIEWS; Future  - fluticasone (FLOVENT HFA) 44 MCG/ACT Aerosol; Inhale 2 Puffs 2 times a day.  Dispense: 10.6 g; Refill: 1  - albuterol 108 (90 Base) MCG/ACT Aero Soln inhalation aerosol; Inhale 2 Puffs every four hours as needed for Shortness of Breath.  Dispense: 8 g; Refill: 1      Please note that this note was created using voice recognition software.

## 2023-03-14 ENCOUNTER — HOSPITAL ENCOUNTER (OUTPATIENT)
Dept: LAB | Facility: MEDICAL CENTER | Age: 73
End: 2023-03-14
Attending: FAMILY MEDICINE
Payer: MEDICARE

## 2023-03-14 ENCOUNTER — HOSPITAL ENCOUNTER (OUTPATIENT)
Dept: LAB | Facility: MEDICAL CENTER | Age: 73
End: 2023-03-14
Attending: INTERNAL MEDICINE
Payer: MEDICARE

## 2023-03-14 DIAGNOSIS — E03.9 ACQUIRED HYPOTHYROIDISM: ICD-10-CM

## 2023-03-14 DIAGNOSIS — R53.83 OTHER FATIGUE: ICD-10-CM

## 2023-03-14 LAB
25(OH)D3 SERPL-MCNC: 81 NG/ML (ref 30–100)
ALBUMIN SERPL BCP-MCNC: 3.8 G/DL (ref 3.2–4.9)
ALBUMIN/GLOB SERPL: 1.5 G/DL
ALP SERPL-CCNC: 82 U/L (ref 30–99)
ALT SERPL-CCNC: 16 U/L (ref 2–50)
ANION GAP SERPL CALC-SCNC: 10 MMOL/L (ref 7–16)
AST SERPL-CCNC: 15 U/L (ref 12–45)
BASOPHILS # BLD AUTO: 0.4 % (ref 0–1.8)
BASOPHILS # BLD: 0.02 K/UL (ref 0–0.12)
BILIRUB SERPL-MCNC: 1 MG/DL (ref 0.1–1.5)
BUN SERPL-MCNC: 15 MG/DL (ref 8–22)
CALCIUM ALBUM COR SERPL-MCNC: 9.5 MG/DL (ref 8.5–10.5)
CALCIUM SERPL-MCNC: 9.3 MG/DL (ref 8.5–10.5)
CHLORIDE SERPL-SCNC: 102 MMOL/L (ref 96–112)
CO2 SERPL-SCNC: 26 MMOL/L (ref 20–33)
CREAT SERPL-MCNC: 0.66 MG/DL (ref 0.5–1.4)
EOSINOPHIL # BLD AUTO: 0.06 K/UL (ref 0–0.51)
EOSINOPHIL NFR BLD: 1.3 % (ref 0–6.9)
ERYTHROCYTE [DISTWIDTH] IN BLOOD BY AUTOMATED COUNT: 43.1 FL (ref 35.9–50)
GFR SERPLBLD CREATININE-BSD FMLA CKD-EPI: 91 ML/MIN/1.73 M 2
GLOBULIN SER CALC-MCNC: 2.5 G/DL (ref 1.9–3.5)
GLUCOSE SERPL-MCNC: 98 MG/DL (ref 65–99)
HCT VFR BLD AUTO: 44.1 % (ref 37–47)
HGB BLD-MCNC: 14.7 G/DL (ref 12–16)
IMM GRANULOCYTES # BLD AUTO: 0.02 K/UL (ref 0–0.11)
IMM GRANULOCYTES NFR BLD AUTO: 0.4 % (ref 0–0.9)
LYMPHOCYTES # BLD AUTO: 1.55 K/UL (ref 1–4.8)
LYMPHOCYTES NFR BLD: 32.4 % (ref 22–41)
MCH RBC QN AUTO: 29.3 PG (ref 27–33)
MCHC RBC AUTO-ENTMCNC: 33.3 G/DL (ref 33.6–35)
MCV RBC AUTO: 88 FL (ref 81.4–97.8)
MONOCYTES # BLD AUTO: 0.36 K/UL (ref 0–0.85)
MONOCYTES NFR BLD AUTO: 7.5 % (ref 0–13.4)
NEUTROPHILS # BLD AUTO: 2.77 K/UL (ref 2–7.15)
NEUTROPHILS NFR BLD: 58 % (ref 44–72)
NRBC # BLD AUTO: 0 K/UL
NRBC BLD-RTO: 0 /100 WBC
PLATELET # BLD AUTO: 225 K/UL (ref 164–446)
PMV BLD AUTO: 11.4 FL (ref 9–12.9)
POTASSIUM SERPL-SCNC: 3.9 MMOL/L (ref 3.6–5.5)
PROT SERPL-MCNC: 6.3 G/DL (ref 6–8.2)
RBC # BLD AUTO: 5.01 M/UL (ref 4.2–5.4)
SODIUM SERPL-SCNC: 138 MMOL/L (ref 135–145)
T3FREE SERPL-MCNC: 5.17 PG/ML (ref 2–4.4)
T4 FREE SERPL-MCNC: 1.59 NG/DL (ref 0.93–1.7)
T4 FREE SERPL-MCNC: 1.61 NG/DL (ref 0.93–1.7)
THYROPEROXIDASE AB SERPL-ACNC: <9 IU/ML (ref 0–9)
TSH SERPL DL<=0.005 MIU/L-ACNC: 0.02 UIU/ML (ref 0.38–5.33)
TSH SERPL DL<=0.005 MIU/L-ACNC: 0.02 UIU/ML (ref 0.38–5.33)
WBC # BLD AUTO: 4.8 K/UL (ref 4.8–10.8)

## 2023-03-14 PROCEDURE — 84443 ASSAY THYROID STIM HORMONE: CPT | Mod: 91

## 2023-03-14 PROCEDURE — 84481 FREE ASSAY (FT-3): CPT

## 2023-03-14 PROCEDURE — 82306 VITAMIN D 25 HYDROXY: CPT

## 2023-03-14 PROCEDURE — 86376 MICROSOMAL ANTIBODY EACH: CPT

## 2023-03-14 PROCEDURE — 85025 COMPLETE CBC W/AUTO DIFF WBC: CPT

## 2023-03-14 PROCEDURE — 84439 ASSAY OF FREE THYROXINE: CPT | Mod: 91

## 2023-03-14 PROCEDURE — 80053 COMPREHEN METABOLIC PANEL: CPT

## 2023-03-14 PROCEDURE — 36415 COLL VENOUS BLD VENIPUNCTURE: CPT

## 2023-03-14 PROCEDURE — 84439 ASSAY OF FREE THYROXINE: CPT

## 2023-03-14 PROCEDURE — 84443 ASSAY THYROID STIM HORMONE: CPT

## 2023-03-15 ENCOUNTER — TELEPHONE (OUTPATIENT)
Dept: MEDICAL GROUP | Facility: LAB | Age: 73
End: 2023-03-15
Payer: MEDICARE

## 2023-03-15 NOTE — TELEPHONE ENCOUNTER
FINAL PRIOR AUTHORIZATION STATUS:    1.  Name of Medication & Dose:  fluticasone (FLOVENT HFA) 44 MCG/ACT Aerosol    2. Prior Auth Status: Approved through 12/21/2023     3. Action Taken: Pharmacy Notified: yes Patient Notified: no    Prior auth submitted to plan through CoverMyMeds and approved.   Key: [id number]    Pharmacy:  [facility] - [place]  P: [phone]  F: [phone]    Medicare/[facility]  P: [phone]  F: [phone]

## 2023-03-29 ENCOUNTER — TELEPHONE (OUTPATIENT)
Dept: SCHEDULING | Facility: IMAGING CENTER | Age: 73
End: 2023-03-29
Payer: MEDICARE

## 2023-07-25 ENCOUNTER — OFFICE VISIT (OUTPATIENT)
Dept: MEDICAL GROUP | Facility: LAB | Age: 73
End: 2023-07-25
Payer: MEDICARE

## 2023-07-25 VITALS
DIASTOLIC BLOOD PRESSURE: 64 MMHG | HEIGHT: 68 IN | BODY MASS INDEX: 27.07 KG/M2 | HEART RATE: 80 BPM | OXYGEN SATURATION: 95 % | RESPIRATION RATE: 14 BRPM | TEMPERATURE: 98.9 F | WEIGHT: 178.6 LBS | SYSTOLIC BLOOD PRESSURE: 110 MMHG

## 2023-07-25 DIAGNOSIS — R42 LIGHTHEADEDNESS: ICD-10-CM

## 2023-07-25 DIAGNOSIS — L98.9 SKIN LESION: ICD-10-CM

## 2023-07-25 DIAGNOSIS — M25.562 MEDIAL KNEE PAIN, LEFT: ICD-10-CM

## 2023-07-25 PROCEDURE — 3074F SYST BP LT 130 MM HG: CPT | Performed by: FAMILY MEDICINE

## 2023-07-25 PROCEDURE — 3078F DIAST BP <80 MM HG: CPT | Performed by: FAMILY MEDICINE

## 2023-07-25 PROCEDURE — 99213 OFFICE O/P EST LOW 20 MIN: CPT | Performed by: FAMILY MEDICINE

## 2023-07-25 ASSESSMENT — FIBROSIS 4 INDEX: FIB4 SCORE: 1.25

## 2023-07-25 ASSESSMENT — PATIENT HEALTH QUESTIONNAIRE - PHQ9: CLINICAL INTERPRETATION OF PHQ2 SCORE: 0

## 2023-07-25 NOTE — PROGRESS NOTES
"Subjective:     CC: Knee pain, lightheadedness, skin lesion    HPI:   Jason presents today with multiple concerns.    Recent left knee pain began after she has been a diabetic down and picking weeds.  This improved with a compression sleeve and some NSAIDs but has since returned.  Initially pain is more lateral but has since moved more medial.  She does have some tenderness over the medial side pain does seem to come and go in response to NSAIDs.  No discrete injury.    Recent illness with nausea but has since improved but some lightheadedness has continued.  Does not feel off balance or vertigo, more lightheadedness with standing up.    Noticed a skin lesion to the posterior right calf.  Has been there for about a month or longer.      Medications, past medical history, allergies, and social history have been reviewed and updated.      Objective:       Exam:  /64 (BP Location: Right arm, Patient Position: Sitting, BP Cuff Size: Adult)   Pulse 80   Temp 37.2 °C (98.9 °F)   Resp 14   Ht 1.715 m (5' 7.5\")   Wt 81 kg (178 lb 9.6 oz)   SpO2 95%   BMI 27.56 kg/m²  Body mass index is 27.56 kg/m².    Constitutional: Alert. Well appearing. No distress.  Skin: The posterior right calf there is a ~0.5 cm pink, pearly papule with well-defined borders.  ENMT: Moist mucous membranes. Normal dentition.  Respiratory: Normal effort. Lungs are clear to auscultation bilaterally.  Cardiovascular: Regular rate and rhythm. Normal S1/S2. No murmurs, rubs or gallops.   MSK: No effusion to the left knee, there is discrete inferior/medial joint line tenderness.  Normal range of motion.  Negative Bola's test.  No laxity.  Negative patellar grind test.  Neuro: Moves all four extremities. No facial droop.  Psych: Answers questions appropriately. Normal affect and mood.      Assessment & Plan:     73 y.o. female with the following -     1. Medial knee pain, left  Knee pain that began after increased activity when she was " weeding the yard.  Responds to NSAIDs.  No discrete injury.  Exam notable for some inferior/medial joint line tenderness but is otherwise normal..  Suspect osteoarthritis.  X-ray ordered.  Discussed course of regular NSAIDs for 1 week, continue activity as tolerated.  Consider physical therapy if not improving.  - DX-KNEE 3 VIEWS Atraumatic Pain/Swelling/Deformity; Future    2. Lightheadedness  Likely orthostasis secondary to decreased intake with recent nausea.  Recommended hydration, small frequent meals with salt.  Follow-up if not improving.    3. Skin lesion  Pearly papule to the right posterior calf.  Concern for BCC versus dermatofibroma.  She will schedule follow-up appointment for biopsy.      Please note that this note was created using voice recognition software.

## 2023-08-02 ENCOUNTER — HOSPITAL ENCOUNTER (OUTPATIENT)
Dept: RADIOLOGY | Facility: MEDICAL CENTER | Age: 73
End: 2023-08-02
Attending: FAMILY MEDICINE
Payer: MEDICARE

## 2023-08-02 ENCOUNTER — PATIENT MESSAGE (OUTPATIENT)
Dept: MEDICAL GROUP | Facility: LAB | Age: 73
End: 2023-08-02
Payer: MEDICARE

## 2023-08-02 DIAGNOSIS — M25.562 MEDIAL KNEE PAIN, LEFT: ICD-10-CM

## 2023-08-02 PROCEDURE — 73562 X-RAY EXAM OF KNEE 3: CPT | Mod: LT

## 2023-08-15 ENCOUNTER — OFFICE VISIT (OUTPATIENT)
Dept: MEDICAL GROUP | Facility: LAB | Age: 73
End: 2023-08-15
Payer: MEDICARE

## 2023-08-15 ENCOUNTER — HOSPITAL ENCOUNTER (OUTPATIENT)
Facility: MEDICAL CENTER | Age: 73
End: 2023-08-15
Attending: FAMILY MEDICINE
Payer: MEDICARE

## 2023-08-15 VITALS
OXYGEN SATURATION: 97 % | RESPIRATION RATE: 12 BRPM | HEART RATE: 88 BPM | HEIGHT: 68 IN | TEMPERATURE: 99.6 F | SYSTOLIC BLOOD PRESSURE: 116 MMHG | BODY MASS INDEX: 25.88 KG/M2 | WEIGHT: 170.8 LBS | DIASTOLIC BLOOD PRESSURE: 62 MMHG

## 2023-08-15 DIAGNOSIS — L98.9 SKIN LESION: ICD-10-CM

## 2023-08-15 LAB — PATHOLOGY CONSULT NOTE: NORMAL

## 2023-08-15 PROCEDURE — 11102 TANGNTL BX SKIN SINGLE LES: CPT | Performed by: FAMILY MEDICINE

## 2023-08-15 PROCEDURE — 3078F DIAST BP <80 MM HG: CPT | Performed by: FAMILY MEDICINE

## 2023-08-15 PROCEDURE — 3074F SYST BP LT 130 MM HG: CPT | Performed by: FAMILY MEDICINE

## 2023-08-15 PROCEDURE — 88305 TISSUE EXAM BY PATHOLOGIST: CPT

## 2023-08-15 RX ADMIN — Medication 2 ML: at 13:20

## 2023-08-15 ASSESSMENT — FIBROSIS 4 INDEX: FIB4 SCORE: 1.25

## 2023-08-15 NOTE — PROCEDURES
Skin Shave    Date/Time: 8/15/2023 12:58 PM    Performed by: Keron Seaman M.D.  Authorized by: Keron Seaman M.D.    Number of Lesions: 1  Lesion 1:     Body area: lower extremity    Lower extremity location: R lower leg    Malignancy: malignancy unknown      Comments:  Patient was prepped and draped in sterile fashion.  Anesthesia with 1% Xylocaine without epinephrine.  Biopsy obtained using shave technique with flexible razor. Bleeding well controlled with pressure and aluminum chloride.  A dressing was applied to the area.  Patient tolerated procedure well.

## 2023-08-15 NOTE — PROGRESS NOTES
"Subjective:     CC: Biopsy    HPI:   Jason presents today for biopsy of skin lesion to the right calf.    Medications, past medical history, allergies, and social history have been reviewed and updated.      Objective:       Exam:  /62 (BP Location: Left arm, Patient Position: Sitting, BP Cuff Size: Adult)   Pulse 88   Temp 37.6 °C (99.6 °F)   Resp 12   Ht 1.715 m (5' 7.52\")   Wt 77.5 kg (170 lb 12.8 oz)   SpO2 97%   BMI 26.34 kg/m²  Body mass index is 26.34 kg/m².    Constitutional: Alert. Well appearing. No distress.  Skin: To the right posterior calf there is a 5mm pink, pearly papule.  Neuro: Moves all four extremities. No facial droop.  Psych: Answers questions appropriately. Normal affect and mood.      Assessment & Plan:     73 y.o. female with the following -     1. Skin lesion  To the right posterior calf there is a 5mm pink, pearly papule.  Shave biopsy done today.  See procedure note.  Await pathology.  - Pathology Specimen; Future  - lidocaine (Xylocaine) 1 % injection 2 mL  - Skin Shave       Please note that this note was created using voice recognition software.      "

## 2023-10-04 ENCOUNTER — APPOINTMENT (OUTPATIENT)
Dept: PHYSICAL THERAPY | Facility: REHABILITATION | Age: 73
End: 2023-10-04
Attending: FAMILY MEDICINE
Payer: MEDICARE

## 2023-10-10 ENCOUNTER — APPOINTMENT (OUTPATIENT)
Dept: PHYSICAL THERAPY | Facility: REHABILITATION | Age: 73
End: 2023-10-10
Attending: FAMILY MEDICINE
Payer: MEDICARE

## 2023-10-12 ENCOUNTER — APPOINTMENT (OUTPATIENT)
Dept: PHYSICAL THERAPY | Facility: REHABILITATION | Age: 73
End: 2023-10-12
Attending: FAMILY MEDICINE
Payer: MEDICARE

## 2023-10-17 ENCOUNTER — APPOINTMENT (OUTPATIENT)
Dept: PHYSICAL THERAPY | Facility: REHABILITATION | Age: 73
End: 2023-10-17
Attending: FAMILY MEDICINE
Payer: MEDICARE

## 2023-10-19 ENCOUNTER — APPOINTMENT (OUTPATIENT)
Dept: PHYSICAL THERAPY | Facility: REHABILITATION | Age: 73
End: 2023-10-19
Attending: FAMILY MEDICINE
Payer: MEDICARE

## 2023-10-24 ENCOUNTER — APPOINTMENT (OUTPATIENT)
Dept: PHYSICAL THERAPY | Facility: REHABILITATION | Age: 73
End: 2023-10-24
Attending: FAMILY MEDICINE
Payer: MEDICARE

## 2023-10-26 ENCOUNTER — APPOINTMENT (OUTPATIENT)
Dept: PHYSICAL THERAPY | Facility: REHABILITATION | Age: 73
End: 2023-10-26
Attending: FAMILY MEDICINE
Payer: MEDICARE

## 2023-10-31 ENCOUNTER — APPOINTMENT (OUTPATIENT)
Dept: PHYSICAL THERAPY | Facility: REHABILITATION | Age: 73
End: 2023-10-31
Attending: FAMILY MEDICINE
Payer: MEDICARE

## 2023-11-02 ENCOUNTER — APPOINTMENT (OUTPATIENT)
Dept: PHYSICAL THERAPY | Facility: REHABILITATION | Age: 73
End: 2023-11-02
Attending: FAMILY MEDICINE
Payer: MEDICARE

## 2023-11-29 ENCOUNTER — PATIENT MESSAGE (OUTPATIENT)
Dept: HEALTH INFORMATION MANAGEMENT | Facility: OTHER | Age: 73
End: 2023-11-29

## 2024-01-23 ENCOUNTER — HOSPITAL ENCOUNTER (OUTPATIENT)
Dept: LAB | Facility: MEDICAL CENTER | Age: 74
End: 2024-01-23
Attending: INTERNAL MEDICINE
Payer: MEDICARE

## 2024-01-23 LAB
CRP SERPL HS-MCNC: 1.5 MG/L (ref 0–3)
CRP SERPL HS-MCNC: <0.3 MG/DL (ref 0–0.75)
ERYTHROCYTE [DISTWIDTH] IN BLOOD BY AUTOMATED COUNT: 42 FL (ref 35.9–50)
ESTRADIOL SERPL-MCNC: <5 PG/ML
FSH SERPL-ACNC: 61.4 MIU/ML
HCT VFR BLD AUTO: 45.9 % (ref 37–47)
HGB BLD-MCNC: 15.3 G/DL (ref 12–16)
LH SERPL-ACNC: 32.7 IU/L
MCH RBC QN AUTO: 29.7 PG (ref 27–33)
MCHC RBC AUTO-ENTMCNC: 33.3 G/DL (ref 32.2–35.5)
MCV RBC AUTO: 89.1 FL (ref 81.4–97.8)
PLATELET # BLD AUTO: 232 K/UL (ref 164–446)
PMV BLD AUTO: 11.8 FL (ref 9–12.9)
RBC # BLD AUTO: 5.15 M/UL (ref 4.2–5.4)
T3FREE SERPL-MCNC: 4.03 PG/ML (ref 2–4.4)
T4 FREE SERPL-MCNC: 1.75 NG/DL (ref 0.93–1.7)
TESTOST SERPL-MCNC: 20 NG/DL (ref 9–75)
TSH SERPL DL<=0.005 MIU/L-ACNC: 0.05 UIU/ML (ref 0.38–5.33)
WBC # BLD AUTO: 5.1 K/UL (ref 4.8–10.8)

## 2024-01-23 PROCEDURE — 85027 COMPLETE CBC AUTOMATED: CPT

## 2024-01-23 PROCEDURE — 84270 ASSAY OF SEX HORMONE GLOBUL: CPT

## 2024-01-23 PROCEDURE — 84481 FREE ASSAY (FT-3): CPT

## 2024-01-23 PROCEDURE — 83002 ASSAY OF GONADOTROPIN (LH): CPT

## 2024-01-23 PROCEDURE — 83529 ASAY OF INTERLEUKIN-6 (IL-6): CPT

## 2024-01-23 PROCEDURE — 36415 COLL VENOUS BLD VENIPUNCTURE: CPT

## 2024-01-23 PROCEDURE — 82670 ASSAY OF TOTAL ESTRADIOL: CPT

## 2024-01-23 PROCEDURE — 83036 HEMOGLOBIN GLYCOSYLATED A1C: CPT

## 2024-01-23 PROCEDURE — 85652 RBC SED RATE AUTOMATED: CPT

## 2024-01-23 PROCEDURE — 84443 ASSAY THYROID STIM HORMONE: CPT

## 2024-01-23 PROCEDURE — 84403 ASSAY OF TOTAL TESTOSTERONE: CPT

## 2024-01-23 PROCEDURE — 83001 ASSAY OF GONADOTROPIN (FSH): CPT

## 2024-01-23 PROCEDURE — 86140 C-REACTIVE PROTEIN: CPT | Mod: XU

## 2024-01-23 PROCEDURE — 84439 ASSAY OF FREE THYROXINE: CPT

## 2024-01-23 PROCEDURE — 86141 C-REACTIVE PROTEIN HS: CPT | Mod: GA

## 2024-01-24 LAB
ERYTHROCYTE [SEDIMENTATION RATE] IN BLOOD BY WESTERGREN METHOD: 7 MM/HOUR (ref 0–25)
EST. AVERAGE GLUCOSE BLD GHB EST-MCNC: 111 MG/DL
HBA1C MFR BLD: 5.5 % (ref 4–5.6)

## 2024-01-26 LAB — IL6 SERPL-MCNC: <2 PG/ML

## 2024-01-27 LAB — SHBG SERPL-SCNC: 77 NMOL/L (ref 17–125)

## 2025-01-10 ENCOUNTER — PATIENT MESSAGE (OUTPATIENT)
Dept: HEALTH INFORMATION MANAGEMENT | Facility: OTHER | Age: 75
End: 2025-01-10

## 2025-01-19 SDOH — ECONOMIC STABILITY: FOOD INSECURITY: WITHIN THE PAST 12 MONTHS, THE FOOD YOU BOUGHT JUST DIDN'T LAST AND YOU DIDN'T HAVE MONEY TO GET MORE.: PATIENT DECLINED

## 2025-01-19 SDOH — ECONOMIC STABILITY: HOUSING INSECURITY

## 2025-01-19 SDOH — HEALTH STABILITY: PHYSICAL HEALTH: ON AVERAGE, HOW MANY DAYS PER WEEK DO YOU ENGAGE IN MODERATE TO STRENUOUS EXERCISE (LIKE A BRISK WALK)?: 6 DAYS

## 2025-01-19 SDOH — HEALTH STABILITY: PHYSICAL HEALTH: ON AVERAGE, HOW MANY MINUTES DO YOU ENGAGE IN EXERCISE AT THIS LEVEL?: 30 MIN

## 2025-01-19 SDOH — HEALTH STABILITY: MENTAL HEALTH
STRESS IS WHEN SOMEONE FEELS TENSE, NERVOUS, ANXIOUS, OR CAN'T SLEEP AT NIGHT BECAUSE THEIR MIND IS TROUBLED. HOW STRESSED ARE YOU?: PATIENT DECLINED

## 2025-01-19 SDOH — ECONOMIC STABILITY: FOOD INSECURITY: WITHIN THE PAST 12 MONTHS, YOU WORRIED THAT YOUR FOOD WOULD RUN OUT BEFORE YOU GOT MONEY TO BUY MORE.: PATIENT DECLINED

## 2025-01-19 ASSESSMENT — SOCIAL DETERMINANTS OF HEALTH (SDOH)
HOW OFTEN DO YOU GET TOGETHER WITH FRIENDS OR RELATIVES?: PATIENT DECLINED
IN A TYPICAL WEEK, HOW MANY TIMES DO YOU TALK ON THE PHONE WITH FAMILY, FRIENDS, OR NEIGHBORS?: MORE THAN THREE TIMES A WEEK
HOW OFTEN DO YOU GET TOGETHER WITH FRIENDS OR RELATIVES?: PATIENT DECLINED
HOW OFTEN DO YOU ATTEND CHURCH OR RELIGIOUS SERVICES?: PATIENT DECLINED
HOW MANY DRINKS CONTAINING ALCOHOL DO YOU HAVE ON A TYPICAL DAY WHEN YOU ARE DRINKING: PATIENT DOES NOT DRINK
WITHIN THE PAST 12 MONTHS, YOU WORRIED THAT YOUR FOOD WOULD RUN OUT BEFORE YOU GOT THE MONEY TO BUY MORE: PATIENT DECLINED
IN A TYPICAL WEEK, HOW MANY TIMES DO YOU TALK ON THE PHONE WITH FAMILY, FRIENDS, OR NEIGHBORS?: MORE THAN THREE TIMES A WEEK
HOW OFTEN DO YOU HAVE SIX OR MORE DRINKS ON ONE OCCASION: NEVER
IN THE PAST 12 MONTHS, HAS THE ELECTRIC, GAS, OIL, OR WATER COMPANY THREATENED TO SHUT OFF SERVICE IN YOUR HOME?: NO
DO YOU BELONG TO ANY CLUBS OR ORGANIZATIONS SUCH AS CHURCH GROUPS UNIONS, FRATERNAL OR ATHLETIC GROUPS, OR SCHOOL GROUPS?: PATIENT DECLINED
DO YOU BELONG TO ANY CLUBS OR ORGANIZATIONS SUCH AS CHURCH GROUPS UNIONS, FRATERNAL OR ATHLETIC GROUPS, OR SCHOOL GROUPS?: PATIENT DECLINED
HOW OFTEN DO YOU ATTEND CHURCH OR RELIGIOUS SERVICES?: PATIENT DECLINED
HOW OFTEN DO YOU HAVE A DRINK CONTAINING ALCOHOL: PATIENT DECLINED
HOW OFTEN DO YOU ATTENT MEETINGS OF THE CLUB OR ORGANIZATION YOU BELONG TO?: PATIENT DECLINED
HOW OFTEN DO YOU ATTENT MEETINGS OF THE CLUB OR ORGANIZATION YOU BELONG TO?: PATIENT DECLINED

## 2025-01-19 ASSESSMENT — LIFESTYLE VARIABLES
HOW OFTEN DO YOU HAVE A DRINK CONTAINING ALCOHOL: PATIENT DECLINED
HOW MANY STANDARD DRINKS CONTAINING ALCOHOL DO YOU HAVE ON A TYPICAL DAY: PATIENT DOES NOT DRINK
AUDIT-C TOTAL SCORE: -1
SKIP TO QUESTIONS 9-10: 0
HOW OFTEN DO YOU HAVE SIX OR MORE DRINKS ON ONE OCCASION: NEVER

## 2025-01-22 ENCOUNTER — OFFICE VISIT (OUTPATIENT)
Dept: MEDICAL GROUP | Facility: LAB | Age: 75
End: 2025-01-22
Payer: MEDICARE

## 2025-01-22 ENCOUNTER — HOSPITAL ENCOUNTER (OUTPATIENT)
Dept: LAB | Facility: MEDICAL CENTER | Age: 75
End: 2025-01-22
Attending: FAMILY MEDICINE
Payer: MEDICARE

## 2025-01-22 VITALS
BODY MASS INDEX: 24.71 KG/M2 | RESPIRATION RATE: 16 BRPM | SYSTOLIC BLOOD PRESSURE: 112 MMHG | OXYGEN SATURATION: 96 % | HEART RATE: 80 BPM | WEIGHT: 157.4 LBS | TEMPERATURE: 98 F | DIASTOLIC BLOOD PRESSURE: 60 MMHG | HEIGHT: 67 IN

## 2025-01-22 DIAGNOSIS — Z00.00 MEDICARE ANNUAL WELLNESS VISIT, SUBSEQUENT: ICD-10-CM

## 2025-01-22 DIAGNOSIS — E78.2 MIXED HYPERLIPIDEMIA: ICD-10-CM

## 2025-01-22 DIAGNOSIS — E03.9 ACQUIRED HYPOTHYROIDISM: ICD-10-CM

## 2025-01-22 DIAGNOSIS — U09.9 LONG COVID: ICD-10-CM

## 2025-01-22 DIAGNOSIS — R73.02 IGT (IMPAIRED GLUCOSE TOLERANCE): ICD-10-CM

## 2025-01-22 LAB
ALBUMIN SERPL BCP-MCNC: 3.7 G/DL (ref 3.2–4.9)
ALBUMIN/GLOB SERPL: 1.4 G/DL
ALP SERPL-CCNC: 77 U/L (ref 30–99)
ALT SERPL-CCNC: 13 U/L (ref 2–50)
ANION GAP SERPL CALC-SCNC: 11 MMOL/L (ref 7–16)
AST SERPL-CCNC: 18 U/L (ref 12–45)
BASOPHILS # BLD AUTO: 0.9 % (ref 0–1.8)
BASOPHILS # BLD: 0.05 K/UL (ref 0–0.12)
BILIRUB SERPL-MCNC: 0.8 MG/DL (ref 0.1–1.5)
BUN SERPL-MCNC: 10 MG/DL (ref 8–22)
CALCIUM ALBUM COR SERPL-MCNC: 9.1 MG/DL (ref 8.5–10.5)
CALCIUM SERPL-MCNC: 8.9 MG/DL (ref 8.5–10.5)
CHLORIDE SERPL-SCNC: 104 MMOL/L (ref 96–112)
CHOLEST SERPL-MCNC: 191 MG/DL (ref 100–199)
CO2 SERPL-SCNC: 25 MMOL/L (ref 20–33)
CREAT SERPL-MCNC: 0.64 MG/DL (ref 0.5–1.4)
EOSINOPHIL # BLD AUTO: 0.03 K/UL (ref 0–0.51)
EOSINOPHIL NFR BLD: 0.5 % (ref 0–6.9)
ERYTHROCYTE [DISTWIDTH] IN BLOOD BY AUTOMATED COUNT: 43.8 FL (ref 35.9–50)
EST. AVERAGE GLUCOSE BLD GHB EST-MCNC: 117 MG/DL
GFR SERPLBLD CREATININE-BSD FMLA CKD-EPI: 91 ML/MIN/1.73 M 2
GLOBULIN SER CALC-MCNC: 2.7 G/DL (ref 1.9–3.5)
GLUCOSE SERPL-MCNC: 99 MG/DL (ref 65–99)
HBA1C MFR BLD: 5.7 % (ref 4–5.6)
HCT VFR BLD AUTO: 44.9 % (ref 37–47)
HDLC SERPL-MCNC: 40 MG/DL
HGB BLD-MCNC: 14.7 G/DL (ref 12–16)
IMM GRANULOCYTES # BLD AUTO: 0.01 K/UL (ref 0–0.11)
IMM GRANULOCYTES NFR BLD AUTO: 0.2 % (ref 0–0.9)
LDLC SERPL CALC-MCNC: 125 MG/DL
LYMPHOCYTES # BLD AUTO: 1.52 K/UL (ref 1–4.8)
LYMPHOCYTES NFR BLD: 26.4 % (ref 22–41)
MCH RBC QN AUTO: 29.5 PG (ref 27–33)
MCHC RBC AUTO-ENTMCNC: 32.7 G/DL (ref 32.2–35.5)
MCV RBC AUTO: 90 FL (ref 81.4–97.8)
MONOCYTES # BLD AUTO: 0.4 K/UL (ref 0–0.85)
MONOCYTES NFR BLD AUTO: 7 % (ref 0–13.4)
NEUTROPHILS # BLD AUTO: 3.74 K/UL (ref 1.82–7.42)
NEUTROPHILS NFR BLD: 65 % (ref 44–72)
NRBC # BLD AUTO: 0 K/UL
NRBC BLD-RTO: 0 /100 WBC (ref 0–0.2)
PLATELET # BLD AUTO: 219 K/UL (ref 164–446)
PMV BLD AUTO: 10.8 FL (ref 9–12.9)
POTASSIUM SERPL-SCNC: 4 MMOL/L (ref 3.6–5.5)
PROT SERPL-MCNC: 6.4 G/DL (ref 6–8.2)
RBC # BLD AUTO: 4.99 M/UL (ref 4.2–5.4)
SODIUM SERPL-SCNC: 140 MMOL/L (ref 135–145)
T3 SERPL-MCNC: 160 NG/DL (ref 60–181)
T4 FREE SERPL-MCNC: 1.63 NG/DL (ref 0.93–1.7)
TRIGL SERPL-MCNC: 128 MG/DL (ref 0–149)
TSH SERPL-ACNC: 0.06 UIU/ML (ref 0.35–5.5)
WBC # BLD AUTO: 5.8 K/UL (ref 4.8–10.8)

## 2025-01-22 PROCEDURE — 85025 COMPLETE CBC W/AUTO DIFF WBC: CPT

## 2025-01-22 PROCEDURE — 3074F SYST BP LT 130 MM HG: CPT | Performed by: FAMILY MEDICINE

## 2025-01-22 PROCEDURE — 84443 ASSAY THYROID STIM HORMONE: CPT

## 2025-01-22 PROCEDURE — 83036 HEMOGLOBIN GLYCOSYLATED A1C: CPT

## 2025-01-22 PROCEDURE — 84439 ASSAY OF FREE THYROXINE: CPT

## 2025-01-22 PROCEDURE — G0439 PPPS, SUBSEQ VISIT: HCPCS | Performed by: FAMILY MEDICINE

## 2025-01-22 PROCEDURE — 3078F DIAST BP <80 MM HG: CPT | Performed by: FAMILY MEDICINE

## 2025-01-22 PROCEDURE — 84480 ASSAY TRIIODOTHYRONINE (T3): CPT

## 2025-01-22 PROCEDURE — 80061 LIPID PANEL: CPT

## 2025-01-22 PROCEDURE — 36415 COLL VENOUS BLD VENIPUNCTURE: CPT

## 2025-01-22 PROCEDURE — 80053 COMPREHEN METABOLIC PANEL: CPT

## 2025-01-22 ASSESSMENT — FIBROSIS 4 INDEX: FIB4 SCORE: 1.23

## 2025-01-22 ASSESSMENT — ENCOUNTER SYMPTOMS: GENERAL WELL-BEING: GOOD

## 2025-01-22 ASSESSMENT — PATIENT HEALTH QUESTIONNAIRE - PHQ9: CLINICAL INTERPRETATION OF PHQ2 SCORE: 0

## 2025-01-22 ASSESSMENT — ACTIVITIES OF DAILY LIVING (ADL): BATHING_REQUIRES_ASSISTANCE: 0

## 2025-01-22 NOTE — PROGRESS NOTES
Chief Complaint   Patient presents with    Annual Exam       HPI:  Jason Nation is a 74 y.o. here for Medicare Annual Wellness Visit     Patient Active Problem List    Diagnosis Date Noted    Numbness and tingling in both hands 01/04/2021    Numbness and tingling of both feet 01/04/2021    Gastroesophageal reflux disease without esophagitis 05/26/2020    Restless legs 05/23/2019    Mixed hyperlipidemia 04/10/2019    Acquired hypothyroidism 12/12/2017    IGT (impaired glucose tolerance) 12/12/2017       Current Outpatient Medications   Medication Sig Dispense Refill    NATTOKINASE PO Take 100 mg by mouth every day.      levothyroxine (SYNTHROID) 88 MCG Tab TAKE 1 TABLET BY MOUTH IN THE MORNING ON AN EMPTY STOMACH FOR 90 DAYS      albuterol 108 (90 Base) MCG/ACT Aero Soln inhalation aerosol Inhale 2 Puffs every four hours as needed for Shortness of Breath. 8 g 1    liothyronine (CYTOMEL) 5 MCG Tab Take 1 Tab by mouth 2 Times a Day. 180 Tab 3    VITAMIN D-VITAMIN K PO Take  by mouth.      Quercetin 250 MG Tab Take  by mouth.      Zinc Sulfate (ZINC 15 PO) Take 45 mg by mouth every day. With Calcium and Magnesium      Cholecalciferol (VITAMIN D3) 5000 units Tab Take  by mouth every day.      MULTIPLE VITAMIN PO Take  by mouth every day.      cyanocobalamin (VITAMIN B-12) 100 MCG Tab Take 100 mcg by mouth every 3 days.      meloxicam (MOBIC) 7.5 MG Tab Take 1 Tablet by mouth every day. (Patient not taking: Reported on 1/22/2025) 30 Tablet 1    Omega-3 1000 MG Cap Take  by mouth every 3 days. (Patient not taking: Reported on 1/22/2025)      Flaxseed, Linseed, (FLAXSEED OIL PO) Take  by mouth. (Patient not taking: Reported on 1/22/2025)       No current facility-administered medications for this visit.          Current supplements as per medication list.     Allergies: Fish allergy and Tape    Current social contact/activities: Restorationist, visits with friends     She  reports that she has never smoked. She has never used  smokeless tobacco. She reports current alcohol use. She reports that she does not use drugs.  Counseling given: Not Answered      ROS:    Gait: Uses no assistive device  Ostomy: No  Other tubes: No  Amputations: No  Chronic oxygen use: No  Last eye exam: due  Wears hearing aids: No   : Reports urinary leakage during the last 6 months that has somewhat interfered with their daily activities or sleep. - occasional stress incontinence    Screening:    Depression Screening  Little interest or pleasure in doing things?  0 - not at all  Feeling down, depressed , or hopeless? 0 - not at all  Patient Health Questionnaire Score: 0     If depressive symptoms identified deferred to follow up visit unless specifically addressed in assessment and plan.    Interpretation of PHQ-9 Total Score   Score Severity   1-4 No Depression   5-9 Mild Depression   10-14 Moderate Depression   15-19 Moderately Severe Depression   20-27 Severe Depression    Screening for Cognitive Impairment  Do you or any of your friends or family members have any concern about your memory? No  Three Minute Recall (Leader, Season, Table) 1/3    [place] clock face with all 12 numbers and set the hands to show 10 minutes after 11.  No    Cognitive concerns identified deferred for follow up unless specifically addressed in assessment and plan.    Fall Risk Assessment  Has the patient had two or more falls in the last year or any fall with injury in the last year?  No    Safety Assessment  Do you always wear your seatbelt?  No  Any changes to home needed to function safely? No  Difficulty hearing.  No  Patient counseled about all safety risks that were identified.    Functional Assessment ADLs  Are there any barriers preventing you from cooking for yourself or meeting nutritional needs?  No.    Are there any barriers preventing you from driving safely or obtaining transportation?  No.    Are there any barriers preventing you from using a telephone or calling for  help?  No    Are there any barriers preventing you from shopping?  No.    Are there any barriers preventing you from taking care of your own finances?  No    Are there any barriers preventing you from managing your medications?  No    Are there any barriers preventing you from showering, bathing or dressing yourself? No    Are there any barriers preventing you from doing housework or laundry? No  Are there any barriers preventing you from using the toilet?No  Are you currently engaging in any exercise or physical activity?  No.      Self-Assessment of Health  What is your perception of your health? Good    Do you sleep more than six hours a night? Yes    In the past 7 days, how much did pain keep you from doing your normal work? None    Do you spend quality time with family or friends (virtually or in person)? Yes    Do you usually eat a heart healthy diet that constists of a variety of fruits, vegetables, whole grains and fiber? Yes    Do you eat foods high in fat and/or Fast Food more than three times per week? No    How concerned are you that your medical conditions are not being well managed? Not at all    Are you worried that in the next 2 months, you may not have stable housing that you own, rent, or stay in as part of a household? No      Advance Care Planning  Do you have an Advance Directive, Living Will, Durable Power of , or POLST? No                 Health Maintenance Summary            Overdue - Influenza Vaccine (1) Overdue since 9/1/2024 12/09/2019  Imm Admin: Influenza Vaccine Adult HD    11/16/2015  Outside Immunization: INFLUENZA PF 65Y+ HIGH DOSE, TRIVALENT              Overdue - COVID-19 Vaccine (1 - 2024-25 season) Never done      No completion history exists for this topic.              Annual Wellness Visit (Yearly) Next due on 1/22/2026 01/22/2025  Visit Dx: Medicare annual wellness visit, subsequent    05/23/2019  Initial Annual Wellness Visit - Includes PPPS ()               Bone Density Scan (Every 5 Years) Tentatively due on 3/24/2027      2022  DS-BONE DENSITY STUDY (DEXA)    2022  DS-BONE DENSITY STUDY (DEXA)    2022  DS-BONE DENSITY STUDY (DEXA)    2016  Done              Hepatitis C Screening  Tentatively Complete      2019  Hepatitis C Antibody component of HCV Scrn ( 3802-7222 1xLife)              Pneumococcal Vaccine: 65+ Years (Series Information) Completed      2021  Imm Admin: Pneumococcal polysaccharide vaccine (PPSV-23)    2019  Imm Admin: Pneumococcal Conjugate Vaccine (Prevnar/PCV-13)    2012  Imm Admin: Pneumococcal polysaccharide vaccine (PPSV-23)              Hepatitis A Vaccine (Hep A) (Series Information) Aged Out      No completion history exists for this topic.              Hepatitis B Vaccine (Hep B) (Series Information) Aged Out      No completion history exists for this topic.              HPV Vaccines (Series Information) Aged Out      No completion history exists for this topic.              Polio Vaccine (Inactivated Polio) (Series Information) Aged Out      No completion history exists for this topic.              Meningococcal Immunization (Series Information) Aged Out      No completion history exists for this topic.              Discontinued - Mammogram  Discontinued        Frequency changed to Never automatically (Topic No Longer Applies)    10/31/2023  MA-SCREENING MAMMO BILAT W/TOMOSYNTHESIS W/CAD    2022  MA-SCREENING MAMMO BILAT W/TOMOSYNTHESIS W/CAD    2021  [id number]    12/10/2019  [id number]    Only the first 5 history entries have been loaded, but more history exists.              Discontinued - Colorectal Cancer Screening  Discontinued        Frequency changed to Never automatically (Topic No Longer Applies)    2021  REFERRAL TO GI FOR COLONOSCOPY    2019  REFERRAL TO GI FOR COLONOSCOPY    2016  Colonoscopy (Done)               "Discontinued - IMM DTaP/Tdap/Td Vaccine  Discontinued      10/23/2015  Imm Admin: Tdap Vaccine              Discontinued - Zoster (Shingles) Vaccines  Discontinued      No completion history exists for this topic.                    Patient Care Team:  Keron Seaman M.D. as PCP - General (Family Medicine)  Keron Khanna M.D. as Consulting Physician (Ophthalmology)  Nimo Wild P.A.-C. (Endocrinology)  Mane Nieto D.O. (Inactive) (Gastroenterology)        Social History     Tobacco Use    Smoking status: Never    Smokeless tobacco: Never   Vaping Use    Vaping status: Never Used   Substance Use Topics    Alcohol use: Yes     Comment: rarely    Drug use: No     Family History   Problem Relation Age of Onset    Cancer Mother     Cancer Father     Cancer Paternal Aunt     Cancer Paternal Uncle     Sleep Apnea Neg Hx      She  has a past medical history of Asthma, Breath shortness, Bronchitis, Cataract, Chickenpox, Disorder of thyroid, Irish measles, Heart burn, Hyperlipidemia, Influenza, and Pneumonia (1982).   Past Surgical History:   Procedure Laterality Date    CATARACT PHACO WITH IOL Right 11/7/2017    Procedure: CATARACT PHACO WITH IOL;  Surgeon: Keron Khanna M.D.;  Location: SURGERY SAME DAY [facility];  Service: Ophthalmology    GYN SURGERY  1974    c/section    PRIMARY C SECTION         Exam:   /60 (BP Location: Left arm, Patient Position: Sitting, BP Cuff Size: Adult)   Pulse 80   Temp 36.7 °C (98 °F) (Temporal)   Resp 16   Ht 1.702 m (5' 7\")   Wt 71.4 kg (157 lb 6.4 oz)   SpO2 96%  Body mass index is 24.65 kg/m².    Hearing good.    Dentition good  Alert, oriented in no acute distress.  Eye contact is good, speech goal directed, affect calm  CV: RRR  Lungs: CTAB  Ext\" No edema    Assessment and Plan. The following treatment and monitoring plan is recommended:      1. Medicare annual wellness visit, subsequent    2. Acquired hypothyroidism  No longer following with " endocrinology.  Currently on levothyroxine 88 mcg and liothyronine 5 mcg daily.  Check labs as below.  - TSH; Future  - FREE THYROXINE; Future  - TRIIDOTHYRONINE; Future    3. Long COVID  Main persistent symptom is fatigue.  She had to multiple specialists for this.  Taking some supplements and working on regular activity.    4. IGT (impaired glucose tolerance)  Check A1c.  - CBC WITH DIFFERENTIAL; Future  - Comp Metabolic Panel; Future  - HEMOGLOBIN A1C; Future    5. Mixed hyperlipidemia  Check lipids  - CBC WITH DIFFERENTIAL; Future  - Comp Metabolic Panel; Future  - Lipid Profile; Future      Services suggested: No services needed at this time  Health Care Screening: Age-appropriate preventive services recommended by USPTF and ACIP covered by Medicare were discussed today. Services ordered if indicated and agreed upon by the patient.  Referrals offered: Community-based lifestyle interventions to reduce health risks and promote self-management and wellness, fall prevention, nutrition, physical activity, tobacco-use cessation, weight loss, and mental health services as per orders if indicated.    Discussion today about general wellness and lifestyle habits:    Prevent falls and reduce trip hazards; Cautioned about securing or removing rugs.  Have a working fire alarm and carbon monoxide detector;   Engage in regular physical activity and social activities     Follow-up: No follow-ups on file.

## 2025-01-24 ENCOUNTER — PATIENT MESSAGE (OUTPATIENT)
Dept: MEDICAL GROUP | Facility: LAB | Age: 75
End: 2025-01-24
Payer: MEDICARE

## 2025-01-24 DIAGNOSIS — E03.9 ACQUIRED HYPOTHYROIDISM: ICD-10-CM

## 2025-02-19 ENCOUNTER — HOSPITAL ENCOUNTER (OUTPATIENT)
Dept: LAB | Facility: MEDICAL CENTER | Age: 75
End: 2025-02-19
Attending: FAMILY MEDICINE
Payer: MEDICARE

## 2025-02-19 ENCOUNTER — OFFICE VISIT (OUTPATIENT)
Dept: MEDICAL GROUP | Facility: LAB | Age: 75
End: 2025-02-19
Payer: MEDICARE

## 2025-02-19 VITALS
HEART RATE: 83 BPM | HEIGHT: 67 IN | RESPIRATION RATE: 16 BRPM | BODY MASS INDEX: 24.42 KG/M2 | DIASTOLIC BLOOD PRESSURE: 70 MMHG | WEIGHT: 155.6 LBS | OXYGEN SATURATION: 96 % | TEMPERATURE: 98.5 F | SYSTOLIC BLOOD PRESSURE: 126 MMHG

## 2025-02-19 DIAGNOSIS — H66.001 NON-RECURRENT ACUTE SUPPURATIVE OTITIS MEDIA OF RIGHT EAR WITHOUT SPONTANEOUS RUPTURE OF TYMPANIC MEMBRANE: ICD-10-CM

## 2025-02-19 DIAGNOSIS — M54.12 RIGHT CERVICAL RADICULOPATHY: ICD-10-CM

## 2025-02-19 DIAGNOSIS — R20.0 NUMBNESS AND TINGLING: ICD-10-CM

## 2025-02-19 DIAGNOSIS — R20.2 NUMBNESS AND TINGLING: ICD-10-CM

## 2025-02-19 DIAGNOSIS — E04.1 THYROID NODULE: ICD-10-CM

## 2025-02-19 LAB
ALBUMIN SERPL BCP-MCNC: 4 G/DL (ref 3.2–4.9)
ALBUMIN/GLOB SERPL: 1.4 G/DL
ALP SERPL-CCNC: 64 U/L (ref 30–99)
ALT SERPL-CCNC: 17 U/L (ref 2–50)
ANION GAP SERPL CALC-SCNC: 10 MMOL/L (ref 7–16)
AST SERPL-CCNC: 24 U/L (ref 12–45)
BASOPHILS # BLD AUTO: 0.7 % (ref 0–1.8)
BASOPHILS # BLD: 0.04 K/UL (ref 0–0.12)
BILIRUB SERPL-MCNC: 0.8 MG/DL (ref 0.1–1.5)
BUN SERPL-MCNC: 18 MG/DL (ref 8–22)
CALCIUM ALBUM COR SERPL-MCNC: 9.4 MG/DL (ref 8.5–10.5)
CALCIUM SERPL-MCNC: 9.4 MG/DL (ref 8.5–10.5)
CHLORIDE SERPL-SCNC: 105 MMOL/L (ref 96–112)
CO2 SERPL-SCNC: 24 MMOL/L (ref 20–33)
CREAT SERPL-MCNC: 0.78 MG/DL (ref 0.5–1.4)
EOSINOPHIL # BLD AUTO: 0.05 K/UL (ref 0–0.51)
EOSINOPHIL NFR BLD: 0.9 % (ref 0–6.9)
ERYTHROCYTE [DISTWIDTH] IN BLOOD BY AUTOMATED COUNT: 43.3 FL (ref 35.9–50)
GFR SERPLBLD CREATININE-BSD FMLA CKD-EPI: 78 ML/MIN/1.73 M 2
GLOBULIN SER CALC-MCNC: 2.9 G/DL (ref 1.9–3.5)
GLUCOSE SERPL-MCNC: 95 MG/DL (ref 65–99)
HCT VFR BLD AUTO: 45.3 % (ref 37–47)
HGB BLD-MCNC: 15 G/DL (ref 12–16)
IMM GRANULOCYTES # BLD AUTO: 0.01 K/UL (ref 0–0.11)
IMM GRANULOCYTES NFR BLD AUTO: 0.2 % (ref 0–0.9)
LYMPHOCYTES # BLD AUTO: 1.95 K/UL (ref 1–4.8)
LYMPHOCYTES NFR BLD: 34.3 % (ref 22–41)
MCH RBC QN AUTO: 29.2 PG (ref 27–33)
MCHC RBC AUTO-ENTMCNC: 33.1 G/DL (ref 32.2–35.5)
MCV RBC AUTO: 88.1 FL (ref 81.4–97.8)
MONOCYTES # BLD AUTO: 0.43 K/UL (ref 0–0.85)
MONOCYTES NFR BLD AUTO: 7.6 % (ref 0–13.4)
NEUTROPHILS # BLD AUTO: 3.21 K/UL (ref 1.82–7.42)
NEUTROPHILS NFR BLD: 56.3 % (ref 44–72)
NRBC # BLD AUTO: 0 K/UL
NRBC BLD-RTO: 0 /100 WBC (ref 0–0.2)
PLATELET # BLD AUTO: 244 K/UL (ref 164–446)
PMV BLD AUTO: 11.1 FL (ref 9–12.9)
POTASSIUM SERPL-SCNC: 3.9 MMOL/L (ref 3.6–5.5)
PROT SERPL-MCNC: 6.9 G/DL (ref 6–8.2)
RBC # BLD AUTO: 5.14 M/UL (ref 4.2–5.4)
SODIUM SERPL-SCNC: 139 MMOL/L (ref 135–145)
WBC # BLD AUTO: 5.7 K/UL (ref 4.8–10.8)

## 2025-02-19 PROCEDURE — 85025 COMPLETE CBC W/AUTO DIFF WBC: CPT

## 2025-02-19 PROCEDURE — 99214 OFFICE O/P EST MOD 30 MIN: CPT | Performed by: FAMILY MEDICINE

## 2025-02-19 PROCEDURE — 36415 COLL VENOUS BLD VENIPUNCTURE: CPT

## 2025-02-19 PROCEDURE — 80053 COMPREHEN METABOLIC PANEL: CPT

## 2025-02-19 PROCEDURE — 3074F SYST BP LT 130 MM HG: CPT | Performed by: FAMILY MEDICINE

## 2025-02-19 PROCEDURE — 3078F DIAST BP <80 MM HG: CPT | Performed by: FAMILY MEDICINE

## 2025-02-19 RX ORDER — AMOXICILLIN AND CLAVULANATE POTASSIUM 562.5; 437.5; 62.5 MG/1; MG/1; MG/1
1 TABLET, MULTILAYER, EXTENDED RELEASE ORAL 2 TIMES DAILY
COMMUNITY

## 2025-02-19 ASSESSMENT — FIBROSIS 4 INDEX: FIB4 SCORE: 1.73

## 2025-02-19 NOTE — PROGRESS NOTES
"Subjective:     CC: Numbness    HPI:   Jason presents today with concern for numbness.  About 1 week ago she noticed episodic/intermittent numbness from the right shoulder down the right arm but also into the right neck and right side of the face.  Did also note some left sided facial symptoms as well.  She describes as numbness but notes that it is not really a loss of sensation or a tingling more very \"weird sensation\".  She can now and has always been able to feel light touch to the areas.  No weakness, no slurred speech.  Symptoms come and go and have not been persistently present.    Has also had some pain in the right ear and was treated by [facility] visit for right AOM with course of Augmentin.  Prior to this she had also had an episode of lightheadedness but not vertigo or balance issues.      Current Outpatient Medications   Medication Sig Dispense Refill    VITAMIN K PO Take  by mouth.      amoxicillin-clavulanate XR (AUGMENTIN XR) 1000-62.5 MG Take 1 Tablet by mouth 2 times a day.      NATTOKINASE PO Take 100 mg by mouth every day.      levothyroxine (SYNTHROID) 88 MCG Tab TAKE 1 TABLET BY MOUTH IN THE MORNING ON AN EMPTY STOMACH FOR 90 DAYS      albuterol 108 (90 Base) MCG/ACT Aero Soln inhalation aerosol Inhale 2 Puffs every four hours as needed for Shortness of Breath. 8 g 1    liothyronine (CYTOMEL) 5 MCG Tab Take 1 Tab by mouth 2 Times a Day. 180 Tab 3    Quercetin 250 MG Tab Take  by mouth.      Zinc Sulfate (ZINC 15 PO) Take 45 mg by mouth every day. With Calcium and Magnesium      Cholecalciferol (VITAMIN D3) 5000 units Tab Take  by mouth every day.      MULTIPLE VITAMIN PO Take  by mouth every day.      VITAMIN D-VITAMIN K PO Take  by mouth. (Patient not taking: Reported on 2/19/2025)      cyanocobalamin (VITAMIN B-12) 100 MCG Tab Take 100 mcg by mouth every 3 days. (Patient not taking: Reported on 2/19/2025)       No current facility-administered medications for this visit.       Medications, " "past medical history, allergies, and social history have been reviewed and updated.      Objective:       Exam:  /70 (BP Location: Left arm, Patient Position: Sitting, BP Cuff Size: Adult)   Pulse 83   Temp 36.9 °C (98.5 °F) (Temporal)   Resp 16   Ht 1.702 m (5' 7\")   Wt 70.6 kg (155 lb 9.6 oz)   SpO2 96%   BMI 24.37 kg/m²  Body mass index is 24.37 kg/m².    Constitutional: Alert. Well appearing. No distress.  Skin: Warm, dry, good turgor, no visible rashes.  Neck: Nodularity present to the right thyroid.  ENMT: Moist mucous membranes. Normal dentition.  Left ear canal and TM are clear.  Right canal is partially occluded but visible TM is dull.  Not erythematous or bulging.  Respiratory: Normal effort.   Cardiovascular: Regular rate and rhythm.   Neuro: Cranial nerves II through XII are intact to exam.  5/5 strength both upper extremities.  Sensation to light touch is intact both upper extremities into the face.  Psych: Answers questions appropriately. Normal affect and mood.    Assessment & Plan:     74 y.o. female with the following -     1. Numbness and tingling  2. Right cervical radiculopathy    Complaint of \"numbness\" to right neck, right arm, right face, and left face but description more consistent with an altered sensation.  Sensation is intact to exam and she reports she has never had true numbness or tingling.  Normal neurologic exam and no motor symptoms or weakness.  Unclear etiology.  Bilateral distribution would make CVA unlikely.  Neuroimaging as below to evaluate for structural cause, checking labs.  Follow-up after labs and imaging.  Discussed ER precautions for new or worsening symptoms.    - MR-BRAIN-WITH & W/O; Future  - CBC WITH DIFFERENTIAL; Future  - Comp Metabolic Panel; Future  - MR-CERVICAL SPINE-WITH & W/O; Future    3. Thyroid nodule  Possible right thyroid nodule on exam.  This may be imaged with MRIs as above but if not can plan for follow-up ultrasound.    4. " Non-recurrent acute suppurative otitis media of right ear without spontaneous rupture of tympanic membrane  Started on treatment through outside home visit with Augmentin.  TM mildly dull on exam but overall appears improving.  Did not think this was the source of the additional right sided head and neck symptoms as above.  Complete Augmentin course.      Please note that this note was created using voice recognition software.

## 2025-02-20 ENCOUNTER — RESULTS FOLLOW-UP (OUTPATIENT)
Dept: MEDICAL GROUP | Facility: LAB | Age: 75
End: 2025-02-20

## (undated) DEVICE — ELECTRODE 850 FOAM ADHESIVE - HYDROGEL RADIOTRNSPRNT (50/PK)

## (undated) DEVICE — GLOVE BIOGEL SZ 7 SURGICAL PF LTX - (50PR/BX 4BX/CA)

## (undated) DEVICE — TUBING CLEARLINK DUO-VENT - C-FLO (48EA/CA)

## (undated) DEVICE — CON SEDATION/>5 YR 1ST 15 MIN

## (undated) DEVICE — GLOVE BIOGEL SURGICAL PF LATEX M SIZE 8.5 (50PR/BX 4BX/CA)

## (undated) DEVICE — SENSOR SPO2 NEO LNCS ADHESIVE (20/BX) SEE USER NOTES

## (undated) DEVICE — ATOMIC EDGE ACCURATE DEPTH 550 MICRON (10/CA)

## (undated) DEVICE — NEEDLE CYSTOTOME OPTH VSTC  0.4MM X 16MM - (10/CA)

## (undated) DEVICE — GLOVE BIOGEL PI INDICATOR SZ 6.5 SURGICAL PF LF - (50/BX 4BX/CA)

## (undated) DEVICE — Device

## (undated) DEVICE — KIT  I.V. START (100EA/CA)

## (undated) DEVICE — KNIFE STEP 1.1 (6EA/BX)

## (undated) DEVICE — TIP POLYMER I&A

## (undated) DEVICE — CATHETER IV 20 GA X 1-1/4 ---SURG.& SDS ONLY--- (50EA/BX)

## (undated) DEVICE — CANNULA INJECTION 27G (EYE) - 10/BX ALCON

## (undated) DEVICE — EXTRACTOR CORTEX ANGL LT 26GA - (10/BX) BINKHORST

## (undated) DEVICE — CANNULA DIVIDED ADULT CO2 - SAMPLE W/FEMALE CONNCT (25/CA)

## (undated) DEVICE — SET LEADWIRE 5 LEAD BEDSIDE DISPOSABLE ECG (1SET OF 5/EA)

## (undated) DEVICE — NEEDLE FILTER ASPIRATION 18 GA X 1 1/2 IN (100EA/BX)

## (undated) DEVICE — SYRINGE SAFETY 3 ML 18 GA X 1 1/2 BLUNT LL (100/BX 8BX/CA)